# Patient Record
Sex: MALE | Race: WHITE | Employment: FULL TIME | URBAN - METROPOLITAN AREA
[De-identification: names, ages, dates, MRNs, and addresses within clinical notes are randomized per-mention and may not be internally consistent; named-entity substitution may affect disease eponyms.]

---

## 2017-03-26 RX ORDER — CANAGLIFLOZIN 300 MG/1
TABLET, FILM COATED ORAL
Qty: 30 TAB | Refills: 9 | Status: SHIPPED | OUTPATIENT
Start: 2017-03-26 | End: 2017-05-01 | Stop reason: SDUPTHER

## 2017-05-01 NOTE — TELEPHONE ENCOUNTER
Received faxed refill request for this medication from the pharmacy that is on file. Requesting a 90 day supply. amLODIPine-benazepril (LOTREL) 10-40 mg per capsule  Take 1 Cap by mouth daily. , Normal    canagliflozin (INVOKANA) 300 mg tablet  Normal, Disp-30 Tab, R-9    SITagliptin-metFORMIN (JANUMET XR) 100-1,000 mg TM24  Normal, Disp-30 Tab, R-5    pravastatin (PRAVACHOL) 40 mg tablet  Normal, Disp-90 Tab, R-2

## 2017-05-03 RX ORDER — AMLODIPINE BESYLATE AND BENAZEPRIL HYDROCHLORIDE 10; 40 MG/1; MG/1
1 CAPSULE ORAL DAILY
Qty: 30 CAP | Refills: 5 | Status: SHIPPED | OUTPATIENT
Start: 2017-05-03 | End: 2017-05-08 | Stop reason: SDUPTHER

## 2017-05-03 RX ORDER — PRAVASTATIN SODIUM 40 MG/1
TABLET ORAL
Qty: 30 TAB | Refills: 5 | Status: SHIPPED | OUTPATIENT
Start: 2017-05-03 | End: 2017-05-09 | Stop reason: SDUPTHER

## 2017-05-05 ENCOUNTER — OFFICE VISIT (OUTPATIENT)
Dept: FAMILY MEDICINE CLINIC | Age: 57
End: 2017-05-05

## 2017-05-05 VITALS
HEART RATE: 86 BPM | WEIGHT: 305 LBS | BODY MASS INDEX: 42.7 KG/M2 | SYSTOLIC BLOOD PRESSURE: 140 MMHG | TEMPERATURE: 98 F | RESPIRATION RATE: 14 BRPM | HEIGHT: 71 IN | OXYGEN SATURATION: 97 % | DIASTOLIC BLOOD PRESSURE: 94 MMHG

## 2017-05-05 DIAGNOSIS — E11.8 TYPE 2 DIABETES MELLITUS WITH COMPLICATION, WITHOUT LONG-TERM CURRENT USE OF INSULIN (HCC): Primary | ICD-10-CM

## 2017-05-05 DIAGNOSIS — I10 ESSENTIAL HYPERTENSION: ICD-10-CM

## 2017-05-05 DIAGNOSIS — F32.A DEPRESSION, UNSPECIFIED DEPRESSION TYPE: ICD-10-CM

## 2017-05-05 DIAGNOSIS — E78.00 HYPERCHOLESTEROLEMIA: ICD-10-CM

## 2017-05-05 DIAGNOSIS — G47.30 SLEEP APNEA, UNSPECIFIED TYPE: ICD-10-CM

## 2017-05-05 DIAGNOSIS — Z12.83 SKIN CANCER SCREENING: ICD-10-CM

## 2017-05-05 DIAGNOSIS — I87.2 CHRONIC VENOUS INSUFFICIENCY: ICD-10-CM

## 2017-05-05 DIAGNOSIS — E66.01 MORBID OBESITY, UNSPECIFIED OBESITY TYPE (HCC): Chronic | ICD-10-CM

## 2017-05-05 RX ORDER — LANCETS
EACH MISCELLANEOUS
Qty: 30 EACH | Refills: 11 | Status: SHIPPED | OUTPATIENT
Start: 2017-05-05

## 2017-05-05 RX ORDER — BLOOD-GLUCOSE METER
EACH MISCELLANEOUS
Qty: 1 EACH | Refills: 0 | Status: SHIPPED | OUTPATIENT
Start: 2017-05-05

## 2017-05-05 NOTE — MR AVS SNAPSHOT
Visit Information Date & Time Provider Department Dept. Phone Encounter #  
 5/5/2017  2:40 PM Bryce Ruiz, [address] [phone] [id number] Follow-up Instructions Return in about 3 months (around 8/5/2017) for yearly physical . Upcoming Health Maintenance Date Due Hepatitis C Screening 1960 FOOT EXAM Q1 1/21/1972 EYE EXAM RETINAL OR DILATED Q1 1/21/1972 DTaP/Tdap/Td series (1 - Tdap) 1/21/1983 FOBT Q 1 YEAR AGE 50-75 1/21/2012 HEMOGLOBIN A1C Q6M 6/14/2016 MICROALBUMIN Q1 12/14/2016 LIPID PANEL Q1 12/14/2016 INFLUENZA AGE 9 TO ADULT 8/1/2017 Allergies as of 5/5/2017  Review Complete On: 5/5/2017 By: Dolores Bolanos LPN No Known Allergies Current Immunizations  Reviewed on 2/27/2015 Name Date Pneumococcal Polysaccharide (PPSV-23) 10/30/2014 Not reviewed this visit You Were Diagnosed With   
  
 Codes Comments Type 2 diabetes mellitus with complication, without long-term current use of insulin (HCC)    -  Primary ICD-10-CM: E11.8 ICD-9-CM: 250.90 Essential hypertension     ICD-10-CM: I10 
ICD-9-CM: 401.9 Hypercholesterolemia     ICD-10-CM: E78.00 ICD-9-CM: 272.0 Chronic venous insufficiency     ICD-10-CM: I87.2 ICD-9-CM: 459.81 Morbid obesity, unspecified obesity type     ICD-10-CM: E66.01 
ICD-9-CM: 278.01 Sleep apnea, unspecified type     ICD-10-CM: G47.30 ICD-9-CM: 780.57 Depression, unspecified depression type     ICD-10-CM: F32.9 ICD-9-CM: 820 Skin cancer screening     ICD-10-CM: Z12.83 ICD-9-CM: V76.43 Vitals BP Pulse Temp Resp Height(growth percentile) Weight(growth percentile) (!) 140/94 86 98 °F (36.7 °C) (Oral) 14 5' 11\" (1.803 m) 305 lb (138.3 kg) SpO2 BMI Smoking Status 97% 42.54 kg/m2 Never Smoker Vitals History BMI and BSA Data Body Mass Index Body Surface Area  42.54 kg/m 2 2.63 m 2  
  
  
 Preferred Pharmacy Pharmacy Name Phone [facility] [address] , [address] [phone] Your Updated Medication List  
  
   
This list is accurate as of: 5/5/17  3:36 PM.  Always use your most recent med list. amLODIPine-benazepril 10-40 mg per capsule Commonly known as:  Felipe Noble Take 1 Cap by mouth daily. aspirin delayed-release 81 mg tablet Take  by mouth daily. Blood-Glucose Meter Misc Commonly known as:  ONETOUCH VERIO FLEX Daily testing for diabetes E11.9  
  
 canagliflozin 300 mg tablet Commonly known as:  Larinda Ovidio TAKE 1 TABLET BY MOUTH DAILY  WITH BREAKFAST  
  
 glucose blood VI test strips strip Commonly known as:  Mearl Raymon Daily bg testing for Dm e11.9  
  
 isosorbide mononitrate ER 30 mg tablet Commonly known as:  IMDUR Take 1 Tab by mouth daily. Lancets Misc Daily bg testing for Dm e11.9 LASIX 40 mg tablet Generic drug:  furosemide Take  by mouth daily. metoprolol tartrate 25 mg tablet Commonly known as:  LOPRESSOR Take  by mouth two (2) times a day. pantoprazole 40 mg tablet Commonly known as:  PROTONIX Take 40 mg by mouth daily. prasugrel 10 mg tablet Commonly known as:  EFFIENT Take  by mouth.  
  
 pravastatin 40 mg tablet Commonly known as:  PRAVACHOL  
TAKE 1 TABLET BY MOUTH NIGHTLY psyllium Powd Commonly known as:  METAMUCIL Take  by mouth. SITagliptin-metFORMIN 100-1,000 mg Tm24 Commonly known as:  JANUMET XR  
TAKE ONE TABLET BY MOUTH DAILY WITH DINNER Prescriptions Sent to Pharmacy Refills Blood-Glucose Meter (ONETOUCH VERIO FLEX) misc 0 Sig: Daily testing for diabetes E11.9  Class: Normal  
 Pharmacy: [facility] [address] , [address] Ph #: [phone]  
 glucose blood VI test strips (ONETOUCH VERIO) strip 11  
 Sig: Daily bg testing for Dm e11.9 Class: Normal  
 Pharmacy: [facility] [address], [address] [facility] Ph #: [phone] Lancets misc 11 Sig: Daily bg testing for Dm e11.9 Class: Normal  
 Pharmacy: [facility] [address], [address] [facility] Ph #: [phone] We Performed the Following HEMOGLOBIN A1C WITH EAG [71734 CPT(R)]  DIABETES EYE EXAM [HM6 Custom]  DIABETES FOOT EXAM [HM7 Custom] LIPID PANEL [61973 CPT(R)] METABOLIC PANEL, COMPREHENSIVE [66118 CPT(R)] MICROALBUMIN, UR, RAND W/ MICROALBUMIN/CREA RATIO I607585 CPT(R)] REFERRAL TO DERMATOLOGY [REF19 Custom] Comments:  
 Please evaluate patient for skin cancer eval.  
 REFERRAL TO OPHTHALMOLOGY [REF57 Custom] Comments:  
 Please evaluate patient for updated dm eye exam.  
 REFERRAL TO PODIATRY [REF90 Custom] Comments:  
 Please evaluate patient for diabetic foot exam.  
  
Follow-up Instructions Return in about 3 months (around 8/5/2017) for yearly physical . Referral Information Referral ID Referred By Referred To  
  
 [id number] Jw Mei Not Available Visits Status Start Date End Date 1 New Request 5/5/17 5/5/18 If your referral has a status of pending review or denied, additional information will be sent to support the outcome of this decision. Referral ID Referred By Referred To  
 [id number] Jw Mei Not Available Visits Status Start Date End Date 1 New Request 5/5/17 5/5/18 If your referral has a status of pending review or denied, additional information will be sent to support the outcome of this decision. Referral ID Referred By Referred To  
 [id number] Jw Mei Not Available Visits Status Start Date End Date 1 New Request 5/5/17 5/5/18  If your referral has a status of pending review or denied, additional information will be sent to support the outcome of this decision. Patient Instructions Fasting sugars should be less than 99 on average Sugars 2 hours after meals should be less than 180 on average Introducing [facility] SERVICES! Lisa Fitzpatrick introduces WriteLatex patient portal. Now you can access parts of your medical record, email your doctor's office, and request medication refills online. 1. In your internet browser, go to [email or web address] 2. Click on the First Time User? Click Here link in the Sign In box. You will see the New Member Sign Up page. 3. Enter your WriteLatex Access Code exactly as it appears below. You will not need to use this code after youve completed the sign-up process. If you do not sign up before the expiration date, you must request a new code. · WriteLatex Access Code: [email or web address] Expires: 8/3/2017  3:25 PM 
 
4. Enter the last four digits of your Social Security Number (xxxx) and Date of Birth (mm/dd/yyyy) as indicated and click Submit. You will be taken to the next sign-up page. 5. Create a WriteLatex ID. This will be your WriteLatex login ID and cannot be changed, so think of one that is secure and easy to remember. 6. Create a WriteLatex password. You can change your password at any time. 7. Enter your Password Reset Question and Answer. This can be used at a later time if you forget your password. 8. Enter your e-mail address. You will receive e-mail notification when new information is available in [address] 9. Click Sign Up. You can now view and download portions of your medical record. 10. Click the Download Summary menu link to download a portable copy of your medical information. If you have questions, please visit the Frequently Asked Questions section of the WriteLatex website. Remember, WriteLatex is NOT to be used for urgent needs. For medical emergencies, dial 911. Now available from your iPhone and Android! Please provide this summary of care documentation to your next provider. Your primary care clinician is listed as Matt Delgadillo. If you have any questions after today's visit, please call [phone].

## 2017-05-05 NOTE — PROGRESS NOTES
Pt presents to office today for a follow up on HTN, Diabetes and Cholesterol problem. Pt states that he had seen his Cardiologist Dr. Sarai Ramirez and Dr. Wilhemina Kanner within the last 2 months and have been cleared for 1 year. Chief Complaint   Patient presents with    Hypertension     Follow up.  Diabetes     Follow up.  Cholesterol Problem     Follow up. 1. Have you been to the ER, urgent care clinic since your last visit? Hospitalized since your last visit? No    2. Have you seen or consulted any other health care providers outside of the [address] [place] [address] since your last visit? Include any pap smears or colon screening.  No

## 2017-05-05 NOTE — PATIENT INSTRUCTIONS
Fasting sugars should be less than 99 on average  Sugars 2 hours after meals should be less than 180 on average

## 2017-05-05 NOTE — PROGRESS NOTES
HISTORY OF PRESENT ILLNESS  Meka Moreno is a 56 y.o. male. HPI  Chief Complaint   Patient presents with    Hypertension     Follow up.  Diabetes     Follow up.  Cholesterol Problem     Follow up. Meka Moreno is a 56 y.o. male with a PHMx of DM check up, pmh of cad, HTN, depression, sleep apnea and venous insuff    States he has not seen pcp since 2015, never connected with someone in [facility] and needs dm/routine fu. States he has connected to new cardio-, he had an ablation at [facility] since his last ov and now has a new cardio in [facility]. 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 Dr. Johan Egan at [facility] [address] heart cardiology phone is [phone]  . Dr Richard Carlos is electrophysiology person. States he has to do 90 day rx for [facility]. He has been off meds x 1 week and that is why bp is up. He knows his meds have now been shipped so should get them soon. Still on the effient. States he monitors swelling in his feet, generally controlled on the lasix. He does get muscle cramps  Not taking his potassium reguarly. States he has been feeling good. Needs to get back on his exercise kick. Tolerating excess well. Pt reports he does not check bg regularly. Needs meter. Has not yet seen eye or foot specialist     Has some stress with work. Denies feeling very depressed but does feel stressed sometimes after work. He would like to get back into exercise for this. Current Outpatient Prescriptions   Medication Sig Dispense Refill    psyllium (METAMUCIL) powd Take  by mouth.  Blood-Glucose Meter (ONETOUCH VERIO FLEX) [facility] Daily testing for diabetes E11.9 1 Each 0    glucose blood VI test strips (ONETOUCH VERIO) strip Daily bg testing for Dm e11.9 30 Strip 11    Lancets misc Daily bg testing for Dm e11.9 30 Each 11    pravastatin (PRAVACHOL) 40 mg tablet TAKE 1 TABLET BY MOUTH NIGHTLY 30 Tab 5    amLODIPine-benazepril (LOTREL) 10-40 mg per capsule Take 1 Cap by mouth daily. 30 Cap 5    canagliflozin (INVOKANA) 300 mg tablet TAKE 1 TABLET BY MOUTH DAILY  WITH BREAKFAST 30 Tab 5    SITagliptin-metFORMIN (JANUMET XR) 100-1,000 mg TM24 TAKE ONE TABLET BY MOUTH DAILY WITH DINNER 30 Tab 5    isosorbide mononitrate ER (IMDUR) 30 mg tablet Take 1 Tab by mouth daily. 30 Tab 5    aspirin delayed-release 81 mg tablet Take  by mouth daily.  furosemide (LASIX) 40 mg tablet Take  by mouth daily.  metoprolol (LOPRESSOR) 25 mg tablet Take  by mouth two (2) times a day.  prasugrel (EFFIENT) 10 mg tablet Take  by mouth.  pantoprazole (PROTONIX) 40 mg tablet Take 40 mg by mouth daily. No Known Allergies  Past Medical History:   Diagnosis Date    Arrhythmia     pvcs sp stent placement in 2014.  Depression     [facility]     Hypercholesterolemia     Hypertension     Morbid obesity ([facility]     Sleep apnea      Past Surgical History:   Procedure Laterality Date    CARDIAC SURG PROCEDURE UNLIST  7480,8332    stents, second placed 2/14, Ablation     HX APPENDECTOMY      HX COLONOSCOPY  2013,fall    to see in 2014      Family History   Problem Relation Age of Onset    Cancer Mother     Heart Disease Father     Diabetes Sister     Hypertension Sister      Social History   Substance Use Topics    Smoking status: Never Smoker    Smokeless tobacco: Current User     Types: Chew     Last attempt to quit: 1/1/2014      Comment: Chewing tobacco    Alcohol use 2.5 oz/week     5 Standard drinks or equivalent per week      Comment: Socially       Review of Systems   Constitutional: Negative. Negative for chills, diaphoresis, fever, malaise/fatigue and weight loss. HENT: Negative. Negative for congestion, ear discharge, ear pain, hearing loss, nosebleeds, sore throat and tinnitus. Eyes: Negative. Negative for blurred vision, photophobia, pain, discharge and redness. Respiratory: Negative.   Negative for cough, hemoptysis, sputum production, shortness of breath, wheezing and stridor. Cardiovascular: Negative. Negative for chest pain, palpitations, orthopnea, claudication, leg swelling and PND. Gastrointestinal: Negative. Negative for abdominal pain, diarrhea, heartburn, nausea and vomiting. Musculoskeletal: Negative. Skin: Negative. Neurological: Negative. Negative for weakness and headaches. Endo/Heme/Allergies: Negative. Psychiatric/Behavioral: Negative. All other systems reviewed and are negative. Physical Exam   Constitutional: He is oriented to person, place, and time. He appears well-developed and well-nourished. No distress. HENT:   Head: Normocephalic. Head is without raccoon's eyes. Right Ear: External ear normal.   Left Ear: External ear normal.   Nose: Nose normal. No mucosal edema, rhinorrhea or sinus tenderness. Right sinus exhibits no maxillary sinus tenderness and no frontal sinus tenderness. Left sinus exhibits no maxillary sinus tenderness and no frontal sinus tenderness. Mouth/Throat: Oropharynx is clear and moist. No oropharyngeal exudate. Eyes: Conjunctivae and EOM are normal. Pupils are equal, round, and reactive to light. Right eye exhibits no discharge. Left eye exhibits no discharge. Neck: Normal range of motion. Neck supple. Cardiovascular: Normal rate, regular rhythm, normal heart sounds and intact distal pulses. Pulmonary/Chest: Effort normal and breath sounds normal.   Lymphadenopathy:     He has no cervical adenopathy. Neurological: He is alert and oriented to person, place, and time. Skin: Skin is warm and dry.    Diabetic foot exam:     Left: Reflexes na     Vibratory sensation na    Proprioception normal   Sharp/dull discrimination normal    Filament test decreased to tip of middle toe, otherwise normal   Pulse DP: 2+ (normal)   Pulse PT: 2+ (normal)   Deformities: Yes - has callous   Right: Reflexes na   Vibratory sensation na   Proprioception normal   Sharp/dull discrimination diminished   Filament test reduced sensation with micro filament   Pulse DP: 2+ (normal)   Pulse PT: 2+ (normal)   Deformities: Yes - callous     Psychiatric: He has a normal mood and affect. His behavior is normal. Thought content normal.   Nursing note and vitals reviewed. PHQ over the last two weeks 5/5/2017   Little interest or pleasure in doing things Several days   Feeling down, depressed or hopeless Several days   Total Score PHQ 2 2       ASSESSMENT and PLAN    ICD-10-CM ICD-9-CM    1. Type 2 diabetes mellitus with complication, without long-term current use of insulin ([facility]) E11.8 250.90 MICROALBUMIN, UR, RAND W/ MICROALBUMIN/CREA RATIO      METABOLIC PANEL, COMPREHENSIVE      LIPID PANEL      HEMOGLOBIN A1C WITH EAG       DIABETES FOOT EXAM       DIABETES EYE EXAM      Blood-Glucose Meter (ONETOUCH VERIO FLEX) misc      glucose blood VI test strips (ONETOUCH VERIO) strip      Lancets [facility]      REFERRAL TO PODIATRY      REFERRAL TO OPHTHALMOLOGY   2. Essential hypertension I10 401.9    3. Hypercholesterolemia E78.00 272.0 LIPID PANEL   4. Chronic venous insufficiency I87.2 459.81    5. Morbid obesity, unspecified obesity type E66.01 278.01    6. Sleep apnea, unspecified type G47.30 780.57    7. Depression, unspecified depression type F32.9 311    8. Skin cancer screening Z12.83 V76.43 REFERRAL TO DERMATOLOGY     Jess Parada was seen today for hypertension, diabetes and cholesterol problem. Diagnoses and all orders for this visit:    Type 2 diabetes mellitus with complication, without long-term current use of insulin ([facility]  -   dwp he needs to fu every 3-6 months on his diabetes. Will update labs and adjust regimen as indicated as he has been out of care and out of meds. Restart meds per list and est him with at home bg testing.    MICROALBUMIN, UR, RAND W/ MICROALBUMIN/CREA RATIO  -     METABOLIC PANEL, COMPREHENSIVE  -     LIPID PANEL  -     HEMOGLOBIN A1C WITH EAG  -      DIABETES FOOT EXAM-chronic reduced sensation to right foot. Has chronic  Callous. Referred to podiatrist   -      DIABETES EYE EXAM  -     Blood-Glucose Meter (ONETOUCH VERIO FLEX) misc; Daily testing for diabetes E11.9  -     glucose blood VI test strips (ONETOUCH VERIO) strip; Daily bg testing for Dm e11.9  -     Lancets misc; Daily bg testing for Dm e11.9  -     REFERRAL TO PODIATRY  -     REFERRAL TO OPHTHALMOLOGY  Deviate plan per lab results   Patient Instructions   Fasting sugars should be less than 99 on average  Sugars 2 hours after meals should be less than 180 on average         Essential hypertension  bp elevated, restart medications and recheck in 3 months. Hypercholesterolemia  -     LIPID PANEL  Update labs. Chronic venous insufficiency    Morbid obesity, unspecified obesity type  Counseled with diet and exercise modifications   Sleep apnea, unspecified type  Stable   Depression, unspecified depression type  Will continue to monitor   Skin cancer screening  -   add  REFERRAL TO DERMATOLOGY      Over 50% of the 45 minutes face to face with Ca Jacobo consisted of counseling and/or discussing treatment plans in reference to his care plan as outlined above/reestablishing care for his chronic conditions.               Follow-up Disposition:  Return in about 3 months (around 8/5/2017) for yearly physical .

## 2017-05-06 LAB
ALBUMIN SERPL-MCNC: 4.6 G/DL (ref 3.5–5.5)
ALBUMIN/CREAT UR: 103.6 MG/G CREAT (ref 0–30)
ALBUMIN/GLOB SERPL: 2 {RATIO} (ref 1.2–2.2)
ALP SERPL-CCNC: 57 IU/L (ref 39–117)
ALT SERPL-CCNC: 36 IU/L (ref 0–44)
AST SERPL-CCNC: 21 IU/L (ref 0–40)
BILIRUB SERPL-MCNC: 0.4 MG/DL (ref 0–1.2)
BUN SERPL-MCNC: 10 MG/DL (ref 6–24)
BUN/CREAT SERPL: 11 (ref 9–20)
CALCIUM SERPL-MCNC: 9.6 MG/DL (ref 8.7–10.2)
CHLORIDE SERPL-SCNC: 99 MMOL/L (ref 96–106)
CHOLEST SERPL-MCNC: 206 MG/DL (ref 100–199)
CO2 SERPL-SCNC: 21 MMOL/L (ref 18–29)
CREAT SERPL-MCNC: 0.93 MG/DL (ref 0.76–1.27)
CREAT UR-MCNC: 69.4 MG/DL
EST. AVERAGE GLUCOSE BLD GHB EST-MCNC: 194 MG/DL
GLOBULIN SER CALC-MCNC: 2.3 G/DL (ref 1.5–4.5)
GLUCOSE SERPL-MCNC: 140 MG/DL (ref 65–99)
HBA1C MFR BLD: 8.4 % (ref 4.8–5.6)
HDLC SERPL-MCNC: 44 MG/DL
INTERPRETATION, 910389: NORMAL
LDLC SERPL CALC-MCNC: 127 MG/DL (ref 0–99)
MICROALBUMIN UR-MCNC: 71.9 UG/ML
POTASSIUM SERPL-SCNC: 4.2 MMOL/L (ref 3.5–5.2)
PROT SERPL-MCNC: 6.9 G/DL (ref 6–8.5)
SODIUM SERPL-SCNC: 139 MMOL/L (ref 134–144)
TRIGL SERPL-MCNC: 174 MG/DL (ref 0–149)
VLDLC SERPL CALC-MCNC: 35 MG/DL (ref 5–40)

## 2017-05-07 NOTE — PROGRESS NOTES
Notify pt his a1c is up to 8.4, there is protein in the urine indicating some damage from high blood sugars, LDL is above goal at 127, needs to be less than 100. He has been off his meds prior to visit, cont plan to restart everything and recheck labs in 3 months to hopefully see some improvements.

## 2017-05-09 NOTE — PROGRESS NOTES
Spoke with Pt on his labs results, but he had mentioned at his visit that he had off all his medications for about 1 week, prior to his appointment in the transition over to [address] from his local pharmacy. He reports that his medication came in the mail yesterday and have started back on them today, agrees to follow up in August 2017.

## 2017-05-10 RX ORDER — AMLODIPINE BESYLATE AND BENAZEPRIL HYDROCHLORIDE 10; 40 MG/1; MG/1
1 CAPSULE ORAL DAILY
Qty: 90 CAP | Refills: 1 | Status: SHIPPED | OUTPATIENT
Start: 2017-05-10

## 2017-05-10 RX ORDER — METOPROLOL TARTRATE 25 MG/1
25 TABLET, FILM COATED ORAL 2 TIMES DAILY
Qty: 180 TAB | Refills: 1 | Status: SHIPPED | OUTPATIENT
Start: 2017-05-10

## 2017-05-10 RX ORDER — PRAVASTATIN SODIUM 40 MG/1
TABLET ORAL
Qty: 90 TAB | Refills: 1 | Status: SHIPPED | OUTPATIENT
Start: 2017-05-10

## 2017-05-10 RX ORDER — ISOSORBIDE MONONITRATE 30 MG/1
30 TABLET, EXTENDED RELEASE ORAL DAILY
Qty: 90 TAB | Refills: 1 | Status: SHIPPED | OUTPATIENT
Start: 2017-05-10

## 2017-06-05 ENCOUNTER — TELEPHONE (OUTPATIENT)
Dept: FAMILY MEDICINE CLINIC | Age: 57
End: 2017-06-05

## 2017-06-05 NOTE — TELEPHONE ENCOUNTER
Patient is calling, patient states that he would like a call back from NP [place] or her nurses in regards to him having lower back pain, patient denied making an appointment, patient states that he would just like to speak with a nurse.      Best call back # for patient: [phone]

## 2017-06-05 NOTE — TELEPHONE ENCOUNTER
Writer returned call to patient to review below advisements per NP Hipolito Stallings, patient understood and agreed to understanding.

## 2017-06-05 NOTE — TELEPHONE ENCOUNTER
Writer returned call to patient, patient advised he is having severe lower and left side back pain making ambulating,sitting and activity uncomfortable. Patient is requesting recommendations for pain management and treatment as he was not able to work today due to the pain. Patient advises he lives 1 hour away and is requesting recommendations today.

## 2017-06-06 NOTE — LETTER
Aminata Price introduces [employer]omography patient portal. Now you can access parts of your medical record, email your doctor's office, and request medication refills online. 1. In your internet browser, go to [email or web address] 
2. Click on the First Time User? Click Here link in the Sign In box. You will see the New Member Sign Up page. 3. Enter your Lomography Access Code exactly as it appears below. You will not need to use this code after youve completed the sign-up process. If you do not sign up before the expiration date, you must request a new code. · Lomography Access Code:[email or web address] · Expires: 8/3/2017  3:25 PM 
 
4. Enter the last four digits of your Social Security Number (xxxx) and Date of Birth (mm/dd/yyyy) as indicated and click Submit. You will be taken to the next sign-up page. 5. Create a Lomography ID. This will be your Lomography login ID and cannot be changed, so think of one that is secure and easy to remember. 6. Create a Lomography password. You can change your password at any time. 7. Enter your Password Reset Question and Answer. This can be used at a later time if you forget your password. 8. Enter your e-mail address. You will receive e-mail notification when new information is available in [address] 9. Click Sign Up. You can now view and download portions of your medical record. 10. Click the Download Summary menu link to download a portable copy of your medical information. If you have questions, please visit the Frequently Asked Questions section of the Lomography website. Remember, Lomography is NOT to be used for urgent needs. For medical emergencies, dial 911. Now available from your iPhone and Android!

## 2017-06-06 NOTE — TELEPHONE ENCOUNTER
Writer attempted to return call to patient, left voicemail to call the office. Writer mailed copy of [employer] activation code to patient's home address on file. Message forwarded to provider for recommendations in regard to continued treatment of patient's pain.

## 2017-06-06 NOTE — TELEPHONE ENCOUNTER
Patient went to Urgent Care yesterday and they gave him muscle relaxer's but it hasn't helped much. He wants to know what would be his next step if this doesn't improve soon. He is not able to walk without assistance. I did advise that it takes a few days for the muscle relaxer's to work. Patient also wanted to let you know that his Rx for Junuvia 90 day supply has not been approved yet through [facility]. Can you assist?    He is also interested in [address] but needs assistance in setting this up.     Please call patient at [phone]

## 2017-06-07 NOTE — TELEPHONE ENCOUNTER
Pt made aware of message below, understanding voiced, he states he wants to change the quantity of his Janumet to 90 day supply because it is much cheaper for him. (med is attached to this message)     He also states back pain has worsened, he has follow up appt on 06/16/2017 to discuss next steps

## 2017-06-07 NOTE — TELEPHONE ENCOUNTER
Yes he is on janumet and there should still be 4 refills on his last rx. Please clarify with patient he should have refills at the pharmacy. Also as far as his back if not improving needs appt to eval next steps.  Thanks

## 2024-06-18 ENCOUNTER — HOSPITAL ENCOUNTER (INPATIENT)
Facility: HOSPITAL | Age: 64
LOS: 3 days | Discharge: HOME | End: 2024-06-21
Attending: EMERGENCY MEDICINE | Admitting: PSYCHIATRY & NEUROLOGY
Payer: MEDICARE

## 2024-06-18 DIAGNOSIS — F32.A DEPRESSION WITH SUICIDAL IDEATION: Primary | ICD-10-CM

## 2024-06-18 DIAGNOSIS — R45.851 DEPRESSION WITH SUICIDAL IDEATION: Primary | ICD-10-CM

## 2024-06-18 PROBLEM — F41.1 GAD (GENERALIZED ANXIETY DISORDER): Status: ACTIVE | Noted: 2024-06-18

## 2024-06-18 LAB
ALBUMIN SERPL-MCNC: 3.6 G/DL (ref 3.5–5)
ALBUMIN/GLOB SERPL: 1.2 (ref 1.1–2.2)
ALP SERPL-CCNC: 81 U/L (ref 45–117)
ALT SERPL-CCNC: 33 U/L (ref 12–78)
AMPHET UR QL SCN: NEGATIVE
ANION GAP SERPL CALC-SCNC: 7 MMOL/L (ref 5–15)
APAP SERPL-MCNC: <2 UG/ML (ref 10–30)
AST SERPL-CCNC: 18 U/L (ref 15–37)
BARBITURATES UR QL SCN: NEGATIVE
BASOPHILS # BLD: 0.1 K/UL (ref 0–0.1)
BASOPHILS NFR BLD: 1 % (ref 0–1)
BENZODIAZ UR QL: NEGATIVE
BILIRUB SERPL-MCNC: 1 MG/DL (ref 0.2–1)
BUN SERPL-MCNC: 16 MG/DL (ref 6–20)
BUN/CREAT SERPL: 17 (ref 12–20)
CALCIUM SERPL-MCNC: 9.1 MG/DL (ref 8.5–10.1)
CANNABINOIDS UR QL SCN: NEGATIVE
CHLORIDE SERPL-SCNC: 112 MMOL/L (ref 97–108)
CO2 SERPL-SCNC: 20 MMOL/L (ref 21–32)
COCAINE UR QL SCN: NEGATIVE
CREAT SERPL-MCNC: 0.92 MG/DL (ref 0.7–1.3)
DIFFERENTIAL METHOD BLD: NORMAL
EOSINOPHIL # BLD: 0.1 K/UL (ref 0–0.4)
EOSINOPHIL NFR BLD: 1 % (ref 0–7)
ERYTHROCYTE [DISTWIDTH] IN BLOOD BY AUTOMATED COUNT: 13.7 % (ref 11.5–14.5)
ETHANOL SERPL-MCNC: <10 MG/DL (ref 0–0.08)
GLOBULIN SER CALC-MCNC: 2.9 G/DL (ref 2–4)
GLUCOSE BLD STRIP.AUTO-MCNC: 153 MG/DL (ref 65–117)
GLUCOSE SERPL-MCNC: 186 MG/DL (ref 65–100)
HCT VFR BLD AUTO: 45.8 % (ref 36.6–50.3)
HGB BLD-MCNC: 15.8 G/DL (ref 12.1–17)
IMM GRANULOCYTES # BLD AUTO: 0 K/UL (ref 0–0.04)
IMM GRANULOCYTES NFR BLD AUTO: 0 % (ref 0–0.5)
LYMPHOCYTES # BLD: 1.4 K/UL (ref 0.8–3.5)
LYMPHOCYTES NFR BLD: 18 % (ref 12–49)
Lab: NORMAL
MCH RBC QN AUTO: 30.5 PG (ref 26–34)
MCHC RBC AUTO-ENTMCNC: 34.5 G/DL (ref 30–36.5)
MCV RBC AUTO: 88.4 FL (ref 80–99)
METHADONE UR QL: NEGATIVE
MONOCYTES # BLD: 0.7 K/UL (ref 0–1)
MONOCYTES NFR BLD: 10 % (ref 5–13)
NEUTS SEG # BLD: 5.4 K/UL (ref 1.8–8)
NEUTS SEG NFR BLD: 70 % (ref 32–75)
NRBC # BLD: 0 K/UL (ref 0–0.01)
NRBC BLD-RTO: 0 PER 100 WBC
OPIATES UR QL: NEGATIVE
PCP UR QL: NEGATIVE
PLATELET # BLD AUTO: 195 K/UL (ref 150–400)
PMV BLD AUTO: 10.3 FL (ref 8.9–12.9)
POTASSIUM SERPL-SCNC: 3.3 MMOL/L (ref 3.5–5.1)
PROT SERPL-MCNC: 6.5 G/DL (ref 6.4–8.2)
RBC # BLD AUTO: 5.18 M/UL (ref 4.1–5.7)
SALICYLATES SERPL-MCNC: <1.7 MG/DL (ref 2.8–20)
SERVICE CMNT-IMP: ABNORMAL
SODIUM SERPL-SCNC: 139 MMOL/L (ref 136–145)
SPECIMEN HOLD: NORMAL
WBC # BLD AUTO: 7.7 K/UL (ref 4.1–11.1)

## 2024-06-18 PROCEDURE — 82962 GLUCOSE BLOOD TEST: CPT

## 2024-06-18 PROCEDURE — 85025 COMPLETE CBC W/AUTO DIFF WBC: CPT

## 2024-06-18 PROCEDURE — 99285 EMERGENCY DEPT VISIT HI MDM: CPT

## 2024-06-18 PROCEDURE — 82077 ASSAY SPEC XCP UR&BREATH IA: CPT

## 2024-06-18 PROCEDURE — 80053 COMPREHEN METABOLIC PANEL: CPT

## 2024-06-18 PROCEDURE — 80307 DRUG TEST PRSMV CHEM ANLYZR: CPT

## 2024-06-18 PROCEDURE — 80179 DRUG ASSAY SALICYLATE: CPT

## 2024-06-18 PROCEDURE — 80143 DRUG ASSAY ACETAMINOPHEN: CPT

## 2024-06-18 PROCEDURE — 1240000000 HC EMOTIONAL WELLNESS R&B

## 2024-06-18 PROCEDURE — 6370000000 HC RX 637 (ALT 250 FOR IP)

## 2024-06-18 PROCEDURE — 83036 HEMOGLOBIN GLYCOSYLATED A1C: CPT

## 2024-06-18 PROCEDURE — 36415 COLL VENOUS BLD VENIPUNCTURE: CPT

## 2024-06-18 PROCEDURE — 90791 PSYCH DIAGNOSTIC EVALUATION: CPT

## 2024-06-18 PROCEDURE — 6370000000 HC RX 637 (ALT 250 FOR IP): Performed by: EMERGENCY MEDICINE

## 2024-06-18 RX ORDER — INSULIN DEGLUDEC 200 U/ML
30 INJECTION, SOLUTION SUBCUTANEOUS ONCE
Status: ON HOLD | COMMUNITY
Start: 2024-05-06 | End: 2024-06-21 | Stop reason: HOSPADM

## 2024-06-18 RX ORDER — TRAZODONE HYDROCHLORIDE 50 MG/1
50 TABLET ORAL NIGHTLY PRN
Status: DISCONTINUED | OUTPATIENT
Start: 2024-06-18 | End: 2024-06-21 | Stop reason: HOSPADM

## 2024-06-18 RX ORDER — DIPHENHYDRAMINE HYDROCHLORIDE 50 MG/ML
50 INJECTION INTRAMUSCULAR; INTRAVENOUS EVERY 4 HOURS PRN
Status: DISCONTINUED | OUTPATIENT
Start: 2024-06-18 | End: 2024-06-21 | Stop reason: HOSPADM

## 2024-06-18 RX ORDER — MAGNESIUM HYDROXIDE/ALUMINUM HYDROXICE/SIMETHICONE 120; 1200; 1200 MG/30ML; MG/30ML; MG/30ML
30 SUSPENSION ORAL EVERY 6 HOURS PRN
Status: DISCONTINUED | OUTPATIENT
Start: 2024-06-18 | End: 2024-06-21 | Stop reason: HOSPADM

## 2024-06-18 RX ORDER — ALPRAZOLAM 0.5 MG/1
0.5 TABLET ORAL
Status: COMPLETED | OUTPATIENT
Start: 2024-06-18 | End: 2024-06-18

## 2024-06-18 RX ORDER — HALOPERIDOL 5 MG/1
5 TABLET ORAL EVERY 4 HOURS PRN
Status: DISCONTINUED | OUTPATIENT
Start: 2024-06-18 | End: 2024-06-21 | Stop reason: HOSPADM

## 2024-06-18 RX ORDER — INSULIN LISPRO 100 [IU]/ML
0-4 INJECTION, SOLUTION INTRAVENOUS; SUBCUTANEOUS NIGHTLY
Status: DISCONTINUED | OUTPATIENT
Start: 2024-06-18 | End: 2024-06-21 | Stop reason: HOSPADM

## 2024-06-18 RX ORDER — INSULIN LISPRO 100 [IU]/ML
0-8 INJECTION, SOLUTION INTRAVENOUS; SUBCUTANEOUS
Status: DISCONTINUED | OUTPATIENT
Start: 2024-06-19 | End: 2024-06-21 | Stop reason: HOSPADM

## 2024-06-18 RX ORDER — HYDROXYZINE 50 MG/1
50 TABLET, FILM COATED ORAL 3 TIMES DAILY PRN
Status: DISCONTINUED | OUTPATIENT
Start: 2024-06-18 | End: 2024-06-21 | Stop reason: HOSPADM

## 2024-06-18 RX ORDER — HALOPERIDOL 5 MG/ML
5 INJECTION INTRAMUSCULAR EVERY 4 HOURS PRN
Status: DISCONTINUED | OUTPATIENT
Start: 2024-06-18 | End: 2024-06-21 | Stop reason: HOSPADM

## 2024-06-18 RX ORDER — SENNOSIDES A AND B 8.6 MG/1
1 TABLET, FILM COATED ORAL DAILY PRN
Status: DISCONTINUED | OUTPATIENT
Start: 2024-06-18 | End: 2024-06-21 | Stop reason: HOSPADM

## 2024-06-18 RX ORDER — LOPERAMIDE HYDROCHLORIDE 2 MG/1
2 CAPSULE ORAL 4 TIMES DAILY PRN
COMMUNITY
Start: 2024-06-06

## 2024-06-18 RX ORDER — METOPROLOL SUCCINATE 100 MG/1
100 TABLET, EXTENDED RELEASE ORAL DAILY
COMMUNITY
Start: 2024-04-24

## 2024-06-18 RX ORDER — POLYETHYLENE GLYCOL 3350 17 G/17G
17 POWDER, FOR SOLUTION ORAL DAILY PRN
Status: DISCONTINUED | OUTPATIENT
Start: 2024-06-18 | End: 2024-06-21 | Stop reason: HOSPADM

## 2024-06-18 RX ORDER — BENZOCAINE/MENTHOL 6 MG-10 MG
LOZENGE MUCOUS MEMBRANE EVERY 6 HOURS PRN
Status: DISCONTINUED | OUTPATIENT
Start: 2024-06-18 | End: 2024-06-21 | Stop reason: HOSPADM

## 2024-06-18 RX ORDER — ACETAMINOPHEN 325 MG/1
650 TABLET ORAL EVERY 4 HOURS PRN
Status: DISCONTINUED | OUTPATIENT
Start: 2024-06-18 | End: 2024-06-21 | Stop reason: HOSPADM

## 2024-06-18 RX ORDER — DIGOXIN 125 MCG
125 TABLET ORAL DAILY
COMMUNITY
Start: 2024-03-27

## 2024-06-18 RX ORDER — ATORVASTATIN CALCIUM 40 MG/1
40 TABLET, FILM COATED ORAL DAILY
COMMUNITY
Start: 2024-04-24

## 2024-06-18 RX ORDER — EMPAGLIFLOZIN 25 MG/1
25 TABLET, FILM COATED ORAL DAILY
COMMUNITY
Start: 2024-06-05

## 2024-06-18 RX ORDER — LOPERAMIDE HYDROCHLORIDE 2 MG/1
2 CAPSULE ORAL 4 TIMES DAILY PRN
Status: DISCONTINUED | OUTPATIENT
Start: 2024-06-18 | End: 2024-06-21 | Stop reason: HOSPADM

## 2024-06-18 RX ORDER — SPIRONOLACTONE 25 MG/1
25 TABLET ORAL DAILY
COMMUNITY
Start: 2024-04-25

## 2024-06-18 RX ADMIN — LOPERAMIDE HYDROCHLORIDE 2 MG: 2 CAPSULE ORAL at 23:07

## 2024-06-18 RX ADMIN — TRAZODONE HYDROCHLORIDE 50 MG: 50 TABLET ORAL at 22:53

## 2024-06-18 RX ADMIN — HYDROXYZINE HYDROCHLORIDE 50 MG: 50 TABLET, FILM COATED ORAL at 23:33

## 2024-06-18 RX ADMIN — ALPRAZOLAM 0.5 MG: 0.5 TABLET ORAL at 17:37

## 2024-06-18 ASSESSMENT — ENCOUNTER SYMPTOMS
BACK PAIN: 0
SORE THROAT: 0
SHORTNESS OF BREATH: 0
COUGH: 0
ABDOMINAL PAIN: 0
TROUBLE SWALLOWING: 0
NAUSEA: 0
VOMITING: 0
DIARRHEA: 0

## 2024-06-18 ASSESSMENT — LIFESTYLE VARIABLES
HOW OFTEN DO YOU HAVE A DRINK CONTAINING ALCOHOL: 2-4 TIMES A MONTH
HOW MANY STANDARD DRINKS CONTAINING ALCOHOL DO YOU HAVE ON A TYPICAL DAY: 1 OR 2

## 2024-06-18 ASSESSMENT — SLEEP AND FATIGUE QUESTIONNAIRES
AVERAGE NUMBER OF SLEEP HOURS: 6
DO YOU HAVE DIFFICULTY SLEEPING: YES
SLEEP PATTERN: RESTLESSNESS
DO YOU USE A SLEEP AID: YES

## 2024-06-18 ASSESSMENT — PAIN - FUNCTIONAL ASSESSMENT: PAIN_FUNCTIONAL_ASSESSMENT: NONE - DENIES PAIN

## 2024-06-18 NOTE — ED PROVIDER NOTES
[facility] EMERGENCY DEP  EMERGENCY DEPARTMENT ENCOUNTER      Pt Name: Chaz Jackson  MRN: [id number]  Birthdate 1960  Date of evaluation: 6/18/2024  Provider: GOLDIE Arroyo NP    CHIEF COMPLAINT       Chief Complaint   Patient presents with    Suicidal         HISTORY OF PRESENT ILLNESS   (Location/Symptom, Timing/Onset, Context/Setting, Quality, Duration, Modifying Factors, Severity)  Note limiting factors.   HPI  Patient is a 64-year-old male with past medical history significant for coronary artery disease, sleep apnea, diabetes, depression, hypertension, hypercholesterolemia, atrial fibrillation on Plavix and chronic venous insufficiency who presents to the ED reporting suicidal thoughts and insomnia.Denies fever, headache, neck pain, visual changes, focal weakness or rash. Denies any difficulty breathing, difficulty swallowing, SOB or chest pain. Denies any nausea, vomiting or diarrhea. He asked his significant other to bring him to the ED.        Review of External Medical Records:     Nursing Notes were reviewed.    REVIEW OF SYSTEMS    (2-9 systems for level 4, 10 or more for level 5)     Review of Systems   Constitutional:  Negative for activity change, appetite change, fever and unexpected weight change.   HENT:  Negative for congestion, sore throat and trouble swallowing.    Eyes:  Negative for visual disturbance.   Respiratory:  Negative for cough and shortness of breath.    Cardiovascular:  Negative for chest pain, palpitations and leg swelling.   Gastrointestinal:  Negative for abdominal pain, diarrhea, nausea and vomiting.   Genitourinary:  Negative for dysuria.   Musculoskeletal:  Negative for back pain and neck pain.   Neurological:  Negative for dizziness, light-headedness and headaches.   Psychiatric/Behavioral:  Positive for sleep disturbance and suicidal ideas.    All other systems reviewed and are negative.      Except as noted above the remainder of the review of systems was reviewed and

## 2024-06-18 NOTE — BSMART NOTE
Comprehensive Assessment Form Part 1      Section I - Disposition    Generalized Anxiety Disorder  Unspecified mood disorder    No past medical history on file.    The Medical Doctor to Psychiatrist conference was not completed.  The Medical Doctor is in agreement with Psychiatrist disposition because patient is agreeable to inpatient BHU admission.  The plan is to admit to the BHU.  The on-call Psychiatrist consulted was N/A.  The admitting Psychiatrist will be SPENCER.  The admitting Diagnosis is JOSHUA, unspecified mood disorder.  The Payor source is not on file.     This writer reviewed the Aiken Suicide Severity Rating Scale in nursing flowsheet and the risk level assigned is high risk.  Based on this assessment, the risk of suicide is moderate risk and the plan is to admit to the BHU.    Section II - Integrated Summary  Summary:  Patient arrived to the ED accompanied by his girlfriend, Vita, with complaints of SI and increased anxiety. This clinician met with Cahz face to face in the ED. He was oriented x4 and was calm, cooperative, and pleasant during the assessment. He presented with an anxious mood and became easily worried about things being discussed. He endorses SI with no specific plan, however, states he has been thinking about multiple ways he could attempt. He denies any previous suicide attempts or inpatient BHU admissions. No HI reported.    Chaz has no formal mental health history and is not enrolled in outpatient services. He is not prescribed any medications. He reports having increased anxiety and depression due to life stressors. These stressors include limited finances, trying to move to the [facility] to be closer to his girlfriend and family, and health issues. Chaz reports having some GI issues that he is working to get diagnosed and is scheduled for a colonoscopy in August. He believes it may be Crohn's disease or IBS. He states he has called his insurance to get a list of providers in

## 2024-06-18 NOTE — ED NOTES
Pt in green gown. Clothes and tennis shoes in belongings bag placed in  locker on shelf 5. Pt has been wanded by security.

## 2024-06-18 NOTE — ED TRIAGE NOTES
Pt c/o SI with lots of different plans , denies HI, denies having any alcohol, drugs or weapons on him, denies auditory or visual hallucinations

## 2024-06-18 NOTE — BSMART NOTE
Patient's girlfriend, Vita [phone], will be getting his CPAP from home and returning to [facility] with it for U admission.

## 2024-06-18 NOTE — BSMART NOTE
BSMART assessment completed, and suicide risk level noted to be moderate risk. Charge Nurse Kelly and NP Hannah Yen notified. Concerns not observed. Patient requires a 1:1 constant observer sitter in the ED as he is moderate risk for suicide.

## 2024-06-19 PROBLEM — R45.851 DEPRESSION WITH SUICIDAL IDEATION: Status: ACTIVE | Noted: 2024-06-19

## 2024-06-19 PROBLEM — F32.A DEPRESSION WITH SUICIDAL IDEATION: Status: ACTIVE | Noted: 2024-06-19

## 2024-06-19 LAB
EKG ATRIAL RATE: 81 BPM
EKG DIAGNOSIS: NORMAL
EKG Q-T INTERVAL: 430 MS
EKG QRS DURATION: 166 MS
EKG QTC CALCULATION (BAZETT): 502 MS
EKG R AXIS: 262 DEGREES
EKG T AXIS: 88 DEGREES
EKG VENTRICULAR RATE: 82 BPM
GLUCOSE BLD STRIP.AUTO-MCNC: 114 MG/DL (ref 65–117)
GLUCOSE BLD STRIP.AUTO-MCNC: 119 MG/DL (ref 65–117)
GLUCOSE BLD STRIP.AUTO-MCNC: 119 MG/DL (ref 65–117)
GLUCOSE BLD STRIP.AUTO-MCNC: 87 MG/DL (ref 65–117)
SERVICE CMNT-IMP: ABNORMAL
SERVICE CMNT-IMP: ABNORMAL
SERVICE CMNT-IMP: NORMAL
SERVICE CMNT-IMP: NORMAL

## 2024-06-19 PROCEDURE — 1240000000 HC EMOTIONAL WELLNESS R&B

## 2024-06-19 PROCEDURE — 99221 1ST HOSP IP/OBS SF/LOW 40: CPT | Performed by: CLINICAL NURSE SPECIALIST

## 2024-06-19 PROCEDURE — 82962 GLUCOSE BLOOD TEST: CPT

## 2024-06-19 PROCEDURE — 6370000000 HC RX 637 (ALT 250 FOR IP)

## 2024-06-19 PROCEDURE — 6370000000 HC RX 637 (ALT 250 FOR IP): Performed by: PSYCHIATRY & NEUROLOGY

## 2024-06-19 PROCEDURE — 93005 ELECTROCARDIOGRAM TRACING: CPT

## 2024-06-19 RX ORDER — INSULIN GLARGINE 100 [IU]/ML
15 INJECTION, SOLUTION SUBCUTANEOUS NIGHTLY
Status: DISCONTINUED | OUTPATIENT
Start: 2024-06-19 | End: 2024-06-21 | Stop reason: HOSPADM

## 2024-06-19 RX ORDER — DIGOXIN 125 MCG
125 TABLET ORAL DAILY
Status: DISCONTINUED | OUTPATIENT
Start: 2024-06-19 | End: 2024-06-21 | Stop reason: HOSPADM

## 2024-06-19 RX ORDER — BUMETANIDE 1 MG/1
1 TABLET ORAL DAILY PRN
Status: ON HOLD | COMMUNITY
End: 2024-06-21 | Stop reason: HOSPADM

## 2024-06-19 RX ORDER — DEXTROSE MONOHYDRATE 100 MG/ML
INJECTION, SOLUTION INTRAVENOUS CONTINUOUS PRN
Status: DISCONTINUED | OUTPATIENT
Start: 2024-06-19 | End: 2024-06-21 | Stop reason: HOSPADM

## 2024-06-19 RX ORDER — ASPIRIN 81 MG/1
81 TABLET ORAL DAILY
Status: DISCONTINUED | OUTPATIENT
Start: 2024-06-20 | End: 2024-06-21 | Stop reason: HOSPADM

## 2024-06-19 RX ORDER — INSULIN GLARGINE 100 [IU]/ML
15 INJECTION, SOLUTION SUBCUTANEOUS DAILY
Status: DISCONTINUED | OUTPATIENT
Start: 2024-06-19 | End: 2024-06-19

## 2024-06-19 RX ORDER — ASPIRIN 81 MG/1
81 TABLET ORAL DAILY
COMMUNITY

## 2024-06-19 RX ORDER — ATORVASTATIN CALCIUM 20 MG/1
40 TABLET, FILM COATED ORAL NIGHTLY
Status: DISCONTINUED | OUTPATIENT
Start: 2024-06-19 | End: 2024-06-21 | Stop reason: HOSPADM

## 2024-06-19 RX ORDER — INSULIN GLARGINE 100 [IU]/ML
15 INJECTION, SOLUTION SUBCUTANEOUS NIGHTLY
Status: DISCONTINUED | OUTPATIENT
Start: 2024-06-20 | End: 2024-06-19

## 2024-06-19 RX ORDER — METOPROLOL SUCCINATE 25 MG/1
100 TABLET, EXTENDED RELEASE ORAL DAILY
Status: DISCONTINUED | OUTPATIENT
Start: 2024-06-19 | End: 2024-06-21 | Stop reason: HOSPADM

## 2024-06-19 RX ORDER — SPIRONOLACTONE 25 MG/1
25 TABLET ORAL DAILY
Status: DISCONTINUED | OUTPATIENT
Start: 2024-06-19 | End: 2024-06-21 | Stop reason: HOSPADM

## 2024-06-19 RX ADMIN — TRAZODONE HYDROCHLORIDE 50 MG: 50 TABLET ORAL at 23:44

## 2024-06-19 RX ADMIN — SERTRALINE HYDROCHLORIDE 25 MG: 50 TABLET ORAL at 11:23

## 2024-06-19 RX ADMIN — DIGOXIN 125 MCG: 125 TABLET ORAL at 11:35

## 2024-06-19 RX ADMIN — APIXABAN 5 MG: 5 TABLET, FILM COATED ORAL at 21:04

## 2024-06-19 RX ADMIN — EMPAGLIFLOZIN 25 MG: 10 TABLET, FILM COATED ORAL at 11:23

## 2024-06-19 RX ADMIN — SPIRONOLACTONE 25 MG: 25 TABLET ORAL at 11:23

## 2024-06-19 RX ADMIN — ATORVASTATIN CALCIUM 40 MG: 20 TABLET, FILM COATED ORAL at 21:04

## 2024-06-19 RX ADMIN — HYDROXYZINE HYDROCHLORIDE 50 MG: 50 TABLET, FILM COATED ORAL at 23:44

## 2024-06-19 RX ADMIN — SACUBITRIL AND VALSARTAN 1 TABLET: 24; 26 TABLET, FILM COATED ORAL at 21:03

## 2024-06-19 RX ADMIN — METOPROLOL SUCCINATE 100 MG: 25 TABLET, EXTENDED RELEASE ORAL at 11:23

## 2024-06-19 RX ADMIN — LOPERAMIDE HYDROCHLORIDE 2 MG: 2 CAPSULE ORAL at 17:10

## 2024-06-19 NOTE — BH NOTE
Behavioral Health Interdisciplinary Rounds     Patient Name: Chaz Jackson Age: 64 y.o. Room/Bed:  727/  Primary Diagnosis: JOSHUA (generalized anxiety disorder)  Admission Status: Voluntary     Readmission within 30 days: No  Power of  in place: No  Patient requires a blocked bed: No          Reason for blocked bed: n/a    Sleep hours: new admission       Participation in Care/Groups:  new admission  Medication Compliant?: new admission   PRNS (last 24 hours): yes - sleep     Restraints (last 24 hours):  no  Substance Abuse:  No    24 hour chart check complete: new admission     Patient goal(s) for today: meet with treatment team   Treatment team focus/goals: Plan to assess for medications and discharge needs     Progress note: He is willing to start Zoloft .  He denies SI today .  He signed a KAYKAY for his girlfriend.  He will need follow up.   Pleasant and compliant with treatment team     Patient presents ao x 4 He  appears his  stated age. The patient appearance shows no evidence of impairment. The patient presents Cooperative. His speech shows no evidence of impairment. The patient's affect presents appoiatate . Their affect does appear congruent with their content of speech. The patient does not appear to be responding to internal stimuli. The patient participated fully with treatment and discharge planning discussion.     LOS:  1  Expected LOS: 2 days    Insurance coverage: [facility] Medicare   Family contact: He signed a KAYKAY for  Vita Galvan  Domestic Partner  [phone]       Family requesting physician contact today:  No  Discharge plan: Home/Self Care.   Access to weapons: Yes       Outpatient provider(s): TBD / may be his PCP   Patient's preferred phone number for follow up call: [phone]    Participating treatment team members: Dr. Ruben Caal,RN - Maru Arredondo,EMILY - Paris Guidry, DuglasD.

## 2024-06-19 NOTE — PLAN OF CARE
Problem: Depression  Goal: Will be euthymic at discharge  Description: INTERVENTIONS:  1. Administer medication as ordered  2. Provide emotional support via 1:1 interaction with staff  3. Encourage involvement in milieu/groups/activities  4. Monitor for social isolation  Outcome: Progressing  Note: Out on unit social and engaged. Participating in groups and activities. Discussed diabetes medication adjustments with diabetes education team. Pt verbalized understanding. Describes increase anxiety related to social stressors such as selling home and chronic medical status such as his cardiac rhythm. States he was under the impression coming to ED he would have talked with a therapist and start a medication not being admitted. Describes for past 3 weeks he has started to find psych provider as outpatient. Describes many barriers with not finding an accepting provider. States he primary admission goal was to establish psych provider and a medication. States he feels safe on unit and at his home with his support system. Describes a change of perspective on his stressors once he was admitted to unit and engaging with his peers on unit. Denies SI, no plan, no intent and no self harming behaviors. Staff focus is on offering support and reassurance

## 2024-06-19 NOTE — BH NOTE
TRANSFER - IN REPORT:    Verbal report received from Bismark RN on Chaz Jackson  being received from [facility] ED for routine progression of patient care      Report consisted of patient's Situation, Background, Assessment and   Recommendations(SBAR).     Information from the following report(s) ED Encounter Summary, MAR, and Recent Results was reviewed with the receiving nurse.    Opportunity for questions and clarification was provided.      Assessment completed upon patient's arrival to unit and care assumed.

## 2024-06-19 NOTE — H&P
PSYCHIATRY EVALUATION NOTE    CHIEF COMPLAINT: \"I was freaking out.\"      HISTORY OF PRESENTING COMPLAINT:  Chaz Jackson is a 64 y.o. White (non-) male who is currently admitted to the general side of 7th floor behavioral health Unit at [facility].     Patient says that he was quite overwhelmed for the past few months as he's been trying to figure out how to sell his house in [place] [place] [facility] and move back to [place] where his GF lives, and most of his supports. He's been trying to get in with mental health now for a few weeks but the wait has been long. He reports that his sleep has been terrible and wasn't able to fall or stay asleep. He feels quite anxious through the day. He starts thinking about being dead, which he describes being easier than getting to a happy spot. However, he reports that he wouldn't do it.    Denies experiencing AVH.    Pt reports that he possesses some hunting rifles at home and that he would be willing to let them go before his return.    PAST PSYCHIATRIC HISTORY   No past inpatient psychiatric admissions  No suicide attempts    SUBSTANCE USE HISTORY:  Tobacco: chewing.  Cannabis: occasional   Alcohol: stopped drinking  IVD: none  No Cocaine/Heroin/amphetamines/LSD/PCP/Ketamine      Allergies: No Known Allergies    PAST MEDICAL HISTORY:    Please see H&P for details.     History reviewed. No pertinent past medical history.  Prior to Admission medications    Medication Sig Start Date End Date Taking? Authorizing Provider   spironolactone (ALDACTONE) 25 MG tablet Take 1 tablet by mouth daily 4/25/24  Yes Provider, MD Iman   rivaroxaban (XARELTO) 20 MG TABS tablet Take 1 tablet by mouth 2 times daily (with meals) 6/28/19  Yes Provider, Historical, MD   digoxin (LANOXIN) 125 MCG tablet Take 1 tablet by mouth daily 3/27/24  Yes Provider, Historical, MD   metoprolol succinate (TOPROL XL) 100 MG extended release tablet Take 1 tablet by mouth daily 4/24/24  Yes Provider,  MD Iman   loperamide (IMODIUM) 2 MG capsule Take 1 capsule by mouth 4 times daily as needed for Diarrhea 6/6/24  Yes Provider, MD Iman   TRESIBA FLEXTOUCH 200 UNIT/ML SOPN Inject 30 Units into the skin once Patient takes 30-40 units a day based on BG once in the am 5/6/24  Yes Provider, MD Iman   JARDIANCE 25 MG tablet Take 1 tablet by mouth daily 6/5/24  Yes Provider, MD Iman   atorvastatin (LIPITOR) 40 MG tablet Take 1 tablet by mouth daily 4/24/24  Yes Provider, Iman, MD     [unfilled]  Lab Results   Component Value Date    WBC 7.7 06/18/2024    HGB 15.8 06/18/2024    HCT 45.8 06/18/2024    MCV 88.4 06/18/2024     06/18/2024     Lab Results   Component Value Date     06/18/2024    K 3.3 (L) 06/18/2024     (H) 06/18/2024    CO2 20 (L) 06/18/2024    BUN 16 06/18/2024    CREATININE 0.92 06/18/2024    GLUCOSE 186 (H) 06/18/2024    CALCIUM 9.1 06/18/2024    BILITOT 1.0 06/18/2024    ALKPHOS 81 06/18/2024    AST 18 06/18/2024    ALT 33 06/18/2024    LABGLOM >90 06/18/2024    GLOB 2.9 06/18/2024     FAMILY HISTORY:  No past suicide attempts/completions  No Bipolar disorder or Schizophrenia diagnoses in the family  B, S: ETOH.    PSYCHOSOCIAL HISTORY:  Lives in [place] [facility], was visiting his GF in [place]. She retired, but moved away from [facility].  4 siblings.  Not working.  Has home in [place].  On social security disability.  Daughter in [place], son in [place].    MENTAL STATUS EXAM:  63yo WM has gray hair and unshaven beard.  General appearance:    groomed, psychomotor activity is   Eye contact: Avoids eye contact  Speech: Spontaneous, has NL rate, volume and prosody.  Thought Process is Logical, linear, and goal directed.  Mood is reported as \" \"  Affect is congruent and dysthymic  Passive death wish but no Suicidal intents or plans.   No Homicidal thoughts, intents or plans. Does have access to fire-arms  Denies experiencing hallucinations of any

## 2024-06-19 NOTE — BH NOTE
GROUP THERAPY PROGRESS NOTE    Patient is participating in recreational therapy group.    Group time: 30 minutes    Personal goal for participation: To participate in Bridges and Walls art Activity    Goal orientation: Personal    Group therapy participation:  Active     Therapeutic interventions reviewed and discussed:  Group members were given the opportunity to engage in an art activity that will help them to visualize boundaries and barriers to achieving future goals. Handouts and supplies provided    Impression of participation: Pt was present and engaged in group discussion and activity.       Katherine Gillies, MSW, HP-A

## 2024-06-19 NOTE — PROGRESS NOTES
Behavioral Services  Medicare Certification Upon Admission    I certify that this patient's inpatient psychiatric hospital admission is medically necessary for:    [x] (1) Treatment which could reasonably be expected to improve this patient's condition,       [] (2) Or for diagnostic study;     AND     [x](2) The inpatient psychiatric services are provided while the individual is under the care of a physician and are included in the individualized plan of care.    Estimated length of stay/service 3-5 days.    Plan for post-hospital care Outpatient Psychiatry.    Electronically signed by René Miranda MD on 6/19/2024 at 11:40 AM

## 2024-06-19 NOTE — CARE COORDINATION
06/19/24 0831   ITP   Date of Plan 06/19/24   Date of Next Review 06/26/24   Primary Diagnosis Code JOSHUA   Barriers to Treatment Need for psychoeducation   Strengths Incorporated in Plan Acknowledging need for assistance;Family supports   Plan of Care   Long Term Goal (LTG) Stated in patient/guardian terms Participate in out pt tx 12 wks   Short Term Goal 1   Short Term Goal 1 Client will be oriented to program and staff, and participate in assessment process   Baseline Functioning able to verbalize needs   Target 1 week   Objectives Client will participate in group therapy   Intervention 1 Acknowledge client strengths   Frequency daily   Measured by Staff observation;Self report;Behavioral data   Staff Responsible Clinical staff   Intervention 2 Group therapy   Frequency daily   Measured by Staff observation;Self report   Staff Responsible Clinical staff   Intervention 3 Monitor medications   Frequency daily   Measured by Staff observation;Behavioral data   Staff Responsible Clinical staff   STG Goal 1 Status: Patient Appears to be  Progressing toward treatment plan goal   Crisis/Safety/Discharge Plan   Crisis/Safety Plan Standard program interventions and protocol   Comprehensive Assessment Completion Date 06/19/24   Discharge Plan Pt to discharge home with follow up care       
   06/19/24 1039   Suicidal Ideation   Wish to be Dead Lifetime - No;Past 1 month - No   Non-Specific Active Suicidal Thoughts Lifetime - No;Past 1 month - No   Suicidal Behavior   Actual Attempt Lifetime - No   Has subject engaged in Non-Suicidal Self-Injurious Behavior? Lifetime - No   Interrupted Attempt Lifetime - No   Aborted or Self-Interrupted Attempt Lifetime - No   Preparatory Acts or Behavior Lifetime - No       
No

## 2024-06-19 NOTE — BH NOTE
PSYCHOSOCIAL ASSESSMENT  :Patient identifying info:   Chaz Jackson is a 64 y.o., male admitted 6/18/2024 11:14 AM     Presenting problem and precipitating factors: Pt reported to ED due to increased depressive symptoms, anxiety and SI with no plan. Pt denies HI, AHVH.     Mental status assessment: alert oriented , x's 3 , denies SI, denies AH or VH - insight and judgement are impaired     Strengths/Recreation/Coping Skills:supportive family, safe housing     Collateral information: Vita Galvan  Domestic Partner  [phone]    Current psychiatric /substance abuse providers and contact info: No current psychiatric providers     Previous psychiatric/substance abuse providers and response to treatment: Pt has no previous psych admissions.     Family history of mental illness or substance abuse: Family history of SA - brother and sister     Substance abuse history:  THC   Social History     Tobacco Use    Smoking status: Former     Current packs/day: 0.00     Types: Cigarettes     Quit date: 6/15/2021     Years since quitting: 3.0     Passive exposure: Past    Smokeless tobacco: Current     Types: Chew   Substance Use Topics    Alcohol use: Yes     Alcohol/week: 2.0 standard drinks of alcohol     Types: 2 Cans of beer per week       History of biomedical complications associated with substance abuse: no     Patient's current acceptance of treatment or motivation for change: voluntary     Family constellation: single, I adult child    Is significant other involved? yes    Describe support system: family     Describe living arrangements and home environment: Pt lives alone     GUARDIAN/POA: No    Guardian Name:     Guardian Contact:     Health issues: History reviewed. No pertinent past medical history. On disability for cardiac issues    PCP - Dr. Jonh Campbell in [place]    Trauma history: no     Legal issues: no     History of  service: no     Financial status: disability     Christianity/cultural factors:  not

## 2024-06-19 NOTE — PLAN OF CARE
Problem: Safety - Adult  Goal: Free from fall injury  6/19/2024 0011 by Rosario Simpson RN  Outcome: Progressing   Patient resting quietly in bed. No signs of distress. Even and unlabored breathing. Staff will continue to monitor safety q15 and provide support.

## 2024-06-19 NOTE — ED NOTES
TRANSFER - OUT REPORT:    Verbal report given to Rosario RN on Chaz Jackson  being transferred to [facility] for routine progression of patient care       Report consisted of patient's Situation, Background, Assessment and   Recommendations(SBAR).     Information from the following report(s) Nurse Handoff Report, ED SBAR, Recent Results, and Event Log was reviewed with the receiving nurse.    Cumby Fall Assessment:                           Lines:       Opportunity for questions and clarification was provided.      Patient transported with:  [employer]

## 2024-06-19 NOTE — BH NOTE
4 Eyes Skin Assessment     NAME:  Chaz Jackson  YOB: 1960  MEDICAL RECORD NUMBER:  [id number]    The patient is being assessed for  Admission    I agree that at least one RN has performed a thorough Head to Toe Skin Assessment on the patient. ALL assessment sites listed below have been assessed.      Areas assessed by both nurses:    Head, Face, Ears, Shoulders, Back, Chest, Arms, Elbows, Hands, Sacrum. Buttock, Coccyx, Ischium, and Legs. Feet and Heels        Does the Patient have a Wound? No noted wound(s)       Ortiz Prevention initiated by RN: No  Wound Care Orders initiated by RN: No    Pressure Injury (Stage 3,4, Unstageable, DTI, NWPT, and Complex wounds) if present, place Wound referral order by RN under : No    New Ostomies, if present place, Ostomy referral order under : No     Nurse 1 eSignature: Electronically signed by MELINA MAI RN on 6/18/24 at 10:55 PM EDT    **SHARE this note so that the co-signing nurse can place an eSignature**    Nurse 2 eSignature: April, SE

## 2024-06-19 NOTE — PROGRESS NOTES
Admission Medication Reconciliation:    Information obtained from:  patient interview, Insurance claims data, review of EMR, VAPMP, and communication with Vita (partner) and cardiologist office  RxQuery data available¹:  YES    Comments/Recommendations: Updated PTA meds/reviewed patient's allergies.    1)  Medications verified. Patient's partner brought in pill boxes with medications - attempted to identify. Concerned about Entresto given markings and color, called [facility] to confirm medications and doses.     2)  The [place] Prescription Monitoring Program () was assessed to determine fill history of any controlled medications. The patient has NOT filled any controlled medications in the last 12 months.    3)  Medication changes (since last review):  Added  - apixaban 5 mg BID  - Entresto 24-26 mg BID  - aspirin 81 mg daily  - bumetanide 1 mg daily PRN (not taking)    Removed  - rivaroxaban   ¹RxQuery pharmacy benefit data reflects medications filled and processed through the patient's insurance, however this data does NOT capture whether the medication was picked up or is currently being taken by the patient.    Allergies:  Patient has no known allergies.    Significant PMH/Disease States: History reviewed. No pertinent past medical history.    Chief Complaint for this Admission:    Chief Complaint   Patient presents with    Suicidal     Prior to Admission Medications:   Prior to Admission Medications   Prescriptions Last Dose Informant   JARDIANCE 25 MG tablet 6/17/2024    Sig: Take 1 tablet by mouth daily   TRESIBA FLEXTOUCH 200 UNIT/ML SOPN 6/17/2024    Sig: Inject 30 Units into the skin once Patient takes 30-40 units a day based on BG once in the am   apixaban (ELIQUIS) 5 MG TABS tablet 6/17/2024    Sig: Take 1 tablet by mouth 2 times daily   aspirin 81 MG EC tablet Unknown    Sig: Take 1 tablet by mouth daily   atorvastatin (LIPITOR) 40 MG tablet 6/17/2024    Sig: Take 1 tablet by  mouth daily   bumetanide (BUMEX) 1 MG tablet Not Taking    Sig: Take 1 tablet by mouth daily as needed   Patient not taking: Reported on 6/19/2024   digoxin (LANOXIN) 125 MCG tablet 6/17/2024    Sig: Take 1 tablet by mouth daily   loperamide (IMODIUM) 2 MG capsule 6/17/2024    Sig: Take 1 capsule by mouth 4 times daily as needed for Diarrhea   metoprolol succinate (TOPROL XL) 100 MG extended release tablet 6/17/2024    Sig: Take 1 tablet by mouth daily   sacubitril-valsartan (ENTRESTO) 24-26 MG per tablet 6/17/2024    Sig: Take 1 tablet by mouth 2 times daily   spironolactone (ALDACTONE) 25 MG tablet 6/17/2024    Sig: Take 1 tablet by mouth daily      Facility-Administered Medications: None     Ruthie Guidry RPH

## 2024-06-19 NOTE — BH NOTE
GROUP THERAPY PROGRESS NOTE    Patient is participating in Healthy living group.    Group time: 30 minutes    Personal goal for participation: Develop an understanding of sleep hygiene    Goal orientation: Personal    Group therapy participation: Active     Therapeutic interventions reviewed and discussed: Group members were able watch a video to help develop an understanding of how sleep patterns effect mental health. Members were guided through developing an understanding of sleep hygiene. Handouts provided.    Impression of participation: Pt was present and engaged in group discussion.       Katherine Gillies MSW, QMHP-A

## 2024-06-19 NOTE — CONSULTS
[facility]  PROGRAM FOR DIABETES HEALTH  DIABETES MANAGEMENT CONSULT    Consulted by Nursing for advanced nursing evaluation and care for inpatient blood glucose management.    Evaluation and Action Plan   Chaz Jackson is a 64 y.o. male admitted to [facility] for SI / insomnia - This is his first experience with this type of issue with his mental health.      Discussed diabetes self mgmt practice at initial consult visit.  He endorsed monitoring his BG via CGM, Dexcom G7.  He verbalized a recent A1C result as of 1 month ago at 6.4%.  He is established with providers through [facility] near [place], [place].  He endorses he takes Tresiba 'when he needs it' and self adjusts dosing based on BG.  He provided a brief discussion of his diet - He is more concerned with how to improve his sleep and anxiety that he is experiencing.  Discussed my role in his care re: keeping -180 while hospitalized.      6/19: BG trends since admission noted,  over past 24h.  To keep -180 while in [facility], initiated reduced dose basal insulin as per below with hold parameters if BG <130. Diet order adjusted to carb consistent options.     Management Rationale Action Plan   Medication   Basal needs Using 0.15 units/kg/D based on BMI  50% reduction from PTA basal dosing START Lantus 15 units QHS, HOLD for BG <130   Nutritional needs Covering for carb load with meals  NA   Corrective insulin Using MEDIUM sensitivity  ACHS   Lab [x]        Hemoglobin A1c-ADD ON     Additional orders          Diabetes Discharge Plan   Medication  RESUME diabetes medications per PTA dosing           Initial Presentation   Chaz Jackson is a 64 y.o. male admitted 6/18/24  after experiencing insomnia with SI- multiple plans.      HX: History reviewed. No pertinent past medical history.     INITIAL DX: JOSHUA (generalized anxiety disorder) [F41.1]  Depression with suicidal ideation [F32.A, R45.851]     Current Treatment     TX: U     Hospital Course   Clinical

## 2024-06-19 NOTE — BSMART NOTE
Patient was accepted to [facility] bed 727-A by STACY Munoz on behalf of Dr. Miranda. The number for report is ext [phone]. Patient's nurse notified.

## 2024-06-19 NOTE — PLAN OF CARE
Problem: Safety - Adult  Goal: Free from fall injury  Outcome: Progressing     Problem: Depression  Goal: Will be euthymic at discharge  Description: INTERVENTIONS:  1. Administer medication as ordered  2. Provide emotional support via 1:1 interaction with staff  3. Encourage involvement in milieu/groups/activities  4. Monitor for social isolation  6/19/2024 1540 by Brandy Tong RN  Outcome: Progressing   Pt out on unit engaged with peers. Denies current SI/HI/AVH. Remains medication and meal compliant. Will continue to monitor q15 minutes for safety.

## 2024-06-20 LAB
EST. AVERAGE GLUCOSE BLD GHB EST-MCNC: 128 MG/DL
GLUCOSE BLD STRIP.AUTO-MCNC: 110 MG/DL (ref 65–117)
GLUCOSE BLD STRIP.AUTO-MCNC: 125 MG/DL (ref 65–117)
GLUCOSE BLD STRIP.AUTO-MCNC: 125 MG/DL (ref 65–117)
GLUCOSE BLD STRIP.AUTO-MCNC: 134 MG/DL (ref 65–117)
HBA1C MFR BLD: 6.1 % (ref 4–5.6)
SERVICE CMNT-IMP: ABNORMAL
SERVICE CMNT-IMP: NORMAL

## 2024-06-20 PROCEDURE — 6370000000 HC RX 637 (ALT 250 FOR IP)

## 2024-06-20 PROCEDURE — 82962 GLUCOSE BLOOD TEST: CPT

## 2024-06-20 PROCEDURE — 1240000000 HC EMOTIONAL WELLNESS R&B

## 2024-06-20 PROCEDURE — 6370000000 HC RX 637 (ALT 250 FOR IP): Performed by: PSYCHIATRY & NEUROLOGY

## 2024-06-20 RX ADMIN — SPIRONOLACTONE 25 MG: 25 TABLET ORAL at 08:29

## 2024-06-20 RX ADMIN — APIXABAN 5 MG: 5 TABLET, FILM COATED ORAL at 08:29

## 2024-06-20 RX ADMIN — METOPROLOL SUCCINATE 100 MG: 25 TABLET, EXTENDED RELEASE ORAL at 08:29

## 2024-06-20 RX ADMIN — ASPIRIN 81 MG: 81 TABLET, COATED ORAL at 08:29

## 2024-06-20 RX ADMIN — LOPERAMIDE HYDROCHLORIDE 2 MG: 2 CAPSULE ORAL at 16:33

## 2024-06-20 RX ADMIN — DIGOXIN 125 MCG: 125 TABLET ORAL at 08:29

## 2024-06-20 RX ADMIN — EMPAGLIFLOZIN 25 MG: 10 TABLET, FILM COATED ORAL at 08:28

## 2024-06-20 RX ADMIN — ATORVASTATIN CALCIUM 40 MG: 20 TABLET, FILM COATED ORAL at 20:40

## 2024-06-20 RX ADMIN — SACUBITRIL AND VALSARTAN 1 TABLET: 24; 26 TABLET, FILM COATED ORAL at 08:29

## 2024-06-20 RX ADMIN — APIXABAN 5 MG: 5 TABLET, FILM COATED ORAL at 20:40

## 2024-06-20 RX ADMIN — SERTRALINE HYDROCHLORIDE 50 MG: 50 TABLET ORAL at 08:29

## 2024-06-20 RX ADMIN — TRAZODONE HYDROCHLORIDE 50 MG: 50 TABLET ORAL at 23:06

## 2024-06-20 RX ADMIN — SACUBITRIL AND VALSARTAN 1 TABLET: 24; 26 TABLET, FILM COATED ORAL at 20:40

## 2024-06-20 ASSESSMENT — PAIN - FUNCTIONAL ASSESSMENT: PAIN_FUNCTIONAL_ASSESSMENT: NONE - DENIES PAIN

## 2024-06-20 NOTE — DIABETES MGMT
[facility]  PROGRAM FOR DIABETES HEALTH  DIABETES MANAGEMENT CONSULT    Consulted by Nursing for advanced nursing evaluation and care for inpatient blood glucose management.    Evaluation and Action Plan   Chaz Jackson is a 64 y.o. male admitted to [facility] for SI / insomnia - This is his first experience with this type of issue with his mental health.      Discussed diabetes self mgmt practice at initial consult visit.  He endorsed monitoring his BG via CGM, Dexcom G7.  He verbalized a recent A1C result as of 1 month ago at 6.4%.  He is established with providers through [facility] near [place], [place].  He endorses he takes Tresiba 'when he needs it' and self adjusts dosing based on BG.  He provided a brief discussion of his diet - He is more concerned with how to improve his sleep and anxiety that he is experiencing.  Discussed my role in his care re: keeping -180 while hospitalized.      6/20: BG trends since admission remain in target/ Noted oral Jardiance resumed per PTA dosing- Will hold basal insulin since oral diabetes medication resumed.      Diabetes Management Team to sign off at this point as patient's blood glucose remains stable.  Please re-consult us if patient needs change.  Thank you for including us in their care.    .     Management Rationale Action Plan   Medication   Basal needs Using 0.15 units/kg/D based on BMI  50% reduction from PTA basal dosing HOLD   Nutritional needs Covering for carb load with meals  NA   Corrective insulin Using MEDIUM sensitivity  ACHS   Lab [x]        Hemoglobin A1c-ADD ON     Additional orders  Jardiance 25mg daily resumed while inpatient by psych provider.         Diabetes Discharge Plan   Medication  RESUME diabetes medications per PTA dosing           Initial Presentation   Chaz Jackson is a 64 y.o. male admitted 6/18/24  after experiencing insomnia with SI- multiple plans.      HX: History reviewed. No pertinent past medical history.     INITIAL DX: JOSHUA  bedside.  Total minutes: <25    DELMIS SCHULZ APRN - CNS  Diabetes Clinical Nurse Specialist  Program for Diabetes Health  Access via [employer]

## 2024-06-20 NOTE — PLAN OF CARE
Problem: Depression  Goal: Will be euthymic at discharge  Description: INTERVENTIONS:  1. Administer medication as ordered  2. Provide emotional support via 1:1 interaction with staff  3. Encourage involvement in milieu/groups/activities  4. Monitor for social isolation  6/19/2024 2595 by Chance Jerry RN  Outcome: Progressing     Problem: Anxiety  Goal: Will report anxiety at manageable levels  Description: INTERVENTIONS:  1. Administer medication as ordered  2. Teach and rehearse alternative coping skills  3. Provide emotional support with 1:1 interaction with staff  Outcome: Progressing

## 2024-06-20 NOTE — DISCHARGE INSTRUCTIONS
DISCHARGE SUMMARY    NAME:Chaz Jackson  : 1960  MRN: [id number]    The patient Chaz Jackson exhibits the ability to control behavior in a less restrictive environment.  Patient's level of functioning is improving.  No assaultive/destructive behavior has been observed for the past 24 hours.  No suicidal/homicidal threat or behavior has been observed for the past 24 hours.  There is no evidence of serious medication side effects.  Patient has not been in physical or protective restraints for at least the past 24 hours.    If weapons involved, how are they secured? None    Is patient aware of and in agreement with discharge plan? Yes    Arrangements for medication:  Prescriptions filled through [facility], 30 day supply provided    Copy of discharge instructions to provider?:  Yes    Arrangements for transportation home: Family    Keep all follow up appointments as scheduled, continue to take prescribed medications per physician instructions.  Mental health crisis number:  911 or your local mental health crisis line number at [facility] [phone]       Mental Health Emergency WARM LINE      [phone])      M-F: 9am to 9pm      Sat & Sun: 5pm - 9pm  National suicide prevention lines:                             1-636-CMFVJKH (9-248-827-6459)       8-433-379-TALK ([phone])    Crisis Text Line:  Text HOME to 511365    DISCHARGE SUMMARY from Nurse    PATIENT INSTRUCTIONS:      What to do at Home:  Recommended activity: activity as tolerated,     If you experience any of the following symptoms: racing thoughts, poor sleep that is less than 6 total with more than 3 times you awake that happens consecutively for 3 days, uncontrolled anxiety leading to hopelessness - talk with your support system including friends and family. Engage in your coping skills such as distraction with knitting and reading. If sleep is difficult get out of bed, sit in a chair or couch and work on a simple

## 2024-06-20 NOTE — PLAN OF CARE
Problem: Depression  Goal: Will be euthymic at discharge  Description: INTERVENTIONS:  1. Administer medication as ordered  2. Provide emotional support via 1:1 interaction with staff  3. Encourage involvement in milieu/groups/activities  4. Monitor for social isolation  6/20/2024 1610 by Pam Palacios, RN  Outcome: Progressing     Problem: Anxiety  Goal: Will report anxiety at manageable levels  Description: INTERVENTIONS:  1. Administer medication as ordered  2. Teach and rehearse alternative coping skills  3. Provide emotional support with 1:1 interaction with staff  Recent Flowsheet Documentation  Taken 6/20/2024 1608 by Pam Palacios, RN  Will report anxiety at manageable levels:   Administer medication as ordered   Teach and rehearse alternative coping skills

## 2024-06-20 NOTE — BH NOTE
[facility]  PSYCHIATRIC PROGRESS NOTE    Patient: Chaz Jackson MRN: [id number]  SSN: [id number]    YOB: 1960  Age: 64 y.o.  Sex: male      Admit Date: 6/18/2024    Length of Stay: 2 Days    Legal Status: Voluntary     Chief Complaint: \"I'm thinking more positively.\"     Interval History:   6/20/24- Chaz states he's thinking more positively today. He states he is no longer feeling like a \"negative Carol\" and reports he feels that the medicine is already starting to help, and also feels that participating in group has been beneficial, as it has offered him some perspective about the universality of mental health struggles and has helped him feel like he's not alone. He has been talking with his family and has been trying to figure out his plans for discharge. He is talking about the stress of having to sell his home in [place], but is acknowledging that he does have the help and the resources he needs to get this done. He denies SI/plan/intent, denies HI/plan/intent, denies AVH, no evidence of any psychotic processes observed. No complaints reported with eating or sleeping.  Chaz Jackson has been compliant with medications and finds them beneficial. Denies adverse effects. Positive interactions with peers and staff observed. Denies other changes or new concerns or questions at this time.     Vitals:  Patient Vitals for the past 24 hrs:   Temp Pulse Resp BP SpO2   06/20/24 0728 97.3 °F (36.3 °C) 51 16 115/69 96 %   06/19/24 2103 -- -- -- 120/85 --   06/19/24 2034 97.5 °F (36.4 °C) 65 16 120/85 99 %   06/19/24 1622 97.5 °F (36.4 °C) 99 16 121/70 --   06/19/24 1115 97.7 °F (36.5 °C) 71 16 109/72 98 %     Labs:  Lab Results   Component Value Date/Time    WBC 7.7 06/18/2024 11:29 AM    HGB 15.8 06/18/2024 11:29 AM    HCT 45.8 06/18/2024 11:29 AM     06/18/2024 11:29 AM    MCV 88.4 06/18/2024 11:29 AM      Lab Results   Component Value Date/Time     06/18/2024 11:29 AM    K 3.3 06/18/2024  11:29 AM     06/18/2024 11:29 AM    CO2 20 06/18/2024 11:29 AM    BUN 16 06/18/2024 11:29 AM    GLOB 2.9 06/18/2024 11:29 AM    ALT 33 06/18/2024 11:29 AM      Scheduled Meds:   sertraline  50 mg Oral Daily    atorvastatin  40 mg Oral Nightly    metoprolol succinate  100 mg Oral Daily    spironolactone  25 mg Oral Daily    digoxin  125 mcg Oral Daily    empagliflozin  25 mg Oral Daily    sacubitril-valsartan  1 tablet Oral BID    apixaban  5 mg Oral BID    aspirin  81 mg Oral Daily    [Held by provider] insulin glargine  15 Units SubCUTAneous Nightly    insulin lispro  0-8 Units SubCUTAneous TID WC    insulin lispro  0-4 Units SubCUTAneous Nightly     PRN Meds:  glucose, dextrose bolus **OR** dextrose bolus, glucagon (rDNA), dextrose, acetaminophen, polyethylene glycol, senna, aluminum & magnesium hydroxide-simethicone, hydrOXYzine HCl, haloperidol **OR** haloperidol lactate, diphenhydrAMINE, traZODone, loperamide, hydrocortisone    Mental Status Exam:  64 y.o. male who is moderately groomed, well engaged in evaluation.   Makes fair eye contact.  Psychomotor activity is WNL, no adventitious movements  Speech is normal in volume, tone, output and prosody   Mood is described as \"OK\"   Affect is congruent with mood, euthymic and full  No perceptual abnormalities elicited; no auditory/visual hallucinations reported, no overt signs of psychosis or paranoia.   Thought process is logical, linear and goal directed  Thought content is negative for suicidal or homicidal ideation  Alert, awake and oriented in all spheres  Attention/Concentration are intact  Insight and judgment are intact    Assessment:   Chaz Jackson meets criteria for a diagnosis of:  MDD, Recurrent, Severe, w/o psychosis    Plan:   6/20/24- Continue the medication regimen as prescribed. Tentative discharge tomorrow (Friday)    A coordinated, multidisplinary treatment team round was conducted with the patient, nurses, pharmacist,  and writer

## 2024-06-20 NOTE — INTERDISCIPLINARY ROUNDS
Behavioral Health Interdisciplinary Rounds     Patient Name: Chaz Jackson  Age: 64 y.o.  Room/Bed:  727/  Primary Diagnosis: JOSHUA (generalized anxiety disorder)   Admission Status: Voluntary    Readmission within 30 days: No  Power of  in place: No  Patient requires a blocked bed: No          Reason for blocked bed:   Sleep hours:        Participation in Care/Groups:  Yes  Medication Compliant?: Yes  PRNS (last 24 hours): Antianxiety and Sleep Aid   Restraints (last 24 hours):  No  __________________________________________________  OQ Admission Analysis Survey completed:  OQ Admission Analysis Survey score:  __________________________________________________     Alcohol screening (AUDIT) completed -     If applicable, date SBIRT discussed in treatment team AND documented:    Tobacco - patient is a smoker:    Illegal Drugs use:      24 hour chart check complete: Yes    _______________________________________________    Patient goal(s) for today:   Treatment team focus/goals:   Progress note: Patient met with treatment team and appeared with an anxious but fair mood and affect. Patient denies SI/HI and AHVH at this time, denies issues with medications and feels they are starting to be effective. Patient is discharge focused, plan to discharge tomorrow. SW to provide patient with outpatient resource information but due to relocation and process of moving will not schedule appointments at this time. SW to provide resources near [place], [place] as he will be moving there to live with partner.    LOS:  2  Expected LOS: 3    Participating treatment team members: Chaz Jackson, Dee Vidal, MSW, Myrtle VELASCO RN, Consuelo Munoz, STACY

## 2024-06-20 NOTE — PLAN OF CARE
Problem: Depression  Goal: Will be euthymic at discharge  Description: INTERVENTIONS:  1. Administer medication as ordered  2. Provide emotional support via 1:1 interaction with staff  3. Encourage involvement in milieu/groups/activities  4. Monitor for social isolation  6/20/2024 1050 by Myrtle Dove, RN  Outcome: Progressing  Note: Out on unit brighter affect, appears calm and reports feeling confident in his own ability to problem solve and maintain his own safety. Motivated to talk with his support system including girlfriend and daughter about his needs and and what assistance is needed to problem solve his house sale. Daily goal is to rest and stay active in groups. Staff focus is on offering support and reassurance.

## 2024-06-20 NOTE — BH NOTE
GROUP THERAPY PROGRESS NOTE    Patient is participating in recreational therapy group.    Group time: 30 minutes    Personal goal for participation: To engage in independent recreation activity.    Goal orientation: Personal    Group therapy participation: passive     Therapeutic interventions reviewed and discussed:  Group members are given an opportunity for individual recreation. Pts can choose any appropriate activity.     Impression of participation: Pt was engaged in independent recreation including puzzles, cards, games, tv, drawing/coloring, reading, etc.      Katherine Gillies, MSW, [facility]

## 2024-06-20 NOTE — BH NOTE
GROUP THERAPY PROGRESS NOTE    Patient is participating in psychoeducation group.    Group time: 40 mins.    Personal goal for participation: to develop an understanding of thinking errors and how they affect our day to day thinking patterns.    Goal orientation: Personal    Group therapy participation:  Active     Therapeutic interventions reviewed and discussed:  Group members were guided through learning about thinking traps through discussion and video. Members gained an understanding of how these thinking traps effect perception, relationships, and overall mental health. Members were guided through learning about methods that can be used for changing negative thought patterns. Handouts provided.    Impression of participation:  Pt was present and engaged in group discussion. Pt added insight to group topic.     Katherine Gillies, MSW, [facility]

## 2024-06-20 NOTE — BH NOTE
GROUP THERAPY PROGRESS NOTE    Patient is participating in Self-care group.    Group time: 30 minutes    Personal goal for participation: To gain an understanding of emotions and emotional regulation.    Goal orientation: Personal    Group therapy participation: Active     Therapeutic interventions: Group members were educated about the 8 primary emotions. Members also gained an understanding of what emotions do, how we can regulate them and myths about emotions. Handouts provided.    Impression of participation:  Pt was present and engaged in group discussion and activity. Pt added insight and interacted with SW and peers. Pt was calm, cooperative       Katherine Gillies, MSW, [facility]

## 2024-06-21 VITALS
WEIGHT: 224 LBS | HEIGHT: 72 IN | DIASTOLIC BLOOD PRESSURE: 84 MMHG | SYSTOLIC BLOOD PRESSURE: 132 MMHG | TEMPERATURE: 97.5 F | BODY MASS INDEX: 30.34 KG/M2 | OXYGEN SATURATION: 98 % | RESPIRATION RATE: 20 BRPM | HEART RATE: 55 BPM

## 2024-06-21 LAB
GLUCOSE BLD STRIP.AUTO-MCNC: 113 MG/DL (ref 65–117)
GLUCOSE BLD STRIP.AUTO-MCNC: 127 MG/DL (ref 65–117)
SERVICE CMNT-IMP: ABNORMAL
SERVICE CMNT-IMP: NORMAL

## 2024-06-21 PROCEDURE — 82962 GLUCOSE BLOOD TEST: CPT

## 2024-06-21 PROCEDURE — 6370000000 HC RX 637 (ALT 250 FOR IP): Performed by: PSYCHIATRY & NEUROLOGY

## 2024-06-21 PROCEDURE — 6370000000 HC RX 637 (ALT 250 FOR IP)

## 2024-06-21 RX ORDER — TRAZODONE HYDROCHLORIDE 50 MG/1
50 TABLET ORAL NIGHTLY PRN
Qty: 30 TABLET | Refills: 0 | Status: SHIPPED | OUTPATIENT
Start: 2024-06-21 | End: 2024-07-21

## 2024-06-21 RX ORDER — HYDROXYZINE 50 MG/1
50 TABLET, FILM COATED ORAL 2 TIMES DAILY PRN
Qty: 60 TABLET | Refills: 0 | Status: SHIPPED | OUTPATIENT
Start: 2024-06-21 | End: 2024-07-21

## 2024-06-21 RX ADMIN — LOPERAMIDE HYDROCHLORIDE 2 MG: 2 CAPSULE ORAL at 09:39

## 2024-06-21 RX ADMIN — METOPROLOL SUCCINATE 100 MG: 25 TABLET, EXTENDED RELEASE ORAL at 08:16

## 2024-06-21 RX ADMIN — SACUBITRIL AND VALSARTAN 1 TABLET: 24; 26 TABLET, FILM COATED ORAL at 08:16

## 2024-06-21 RX ADMIN — DIGOXIN 125 MCG: 125 TABLET ORAL at 08:17

## 2024-06-21 RX ADMIN — SPIRONOLACTONE 25 MG: 25 TABLET ORAL at 08:17

## 2024-06-21 RX ADMIN — APIXABAN 5 MG: 5 TABLET, FILM COATED ORAL at 08:17

## 2024-06-21 RX ADMIN — ASPIRIN 81 MG: 81 TABLET, COATED ORAL at 08:17

## 2024-06-21 RX ADMIN — EMPAGLIFLOZIN 25 MG: 10 TABLET, FILM COATED ORAL at 08:17

## 2024-06-21 RX ADMIN — SERTRALINE HYDROCHLORIDE 50 MG: 50 TABLET ORAL at 08:17

## 2024-06-21 NOTE — PLAN OF CARE
0900: Reports feeling well and sleep was not good and feels it may be because he did not take Atarax last night. Staff reports 7+ hours sleep. Reports feeling safe enough to d/c home. [facility] children and girlfriend are here to help and support. [facility] does not have thoughts to hurt self. Medication and safety education provided. Medications sent to [facility] pharmacy for pickup prior to d/c. Girlfriend will  at 1300.      Problem: Discharge Planning  Goal: Discharge to home or other facility with appropriate resources  Outcome: Progressing     Problem: Safety - Adult  Goal: Free from fall injury  Outcome: Progressing     Problem: Depression  Goal: Will be euthymic at discharge  Description: INTERVENTIONS:  1. Administer medication as ordered  2. Provide emotional support via 1:1 interaction with staff  3. Encourage involvement in milieu/groups/activities  4. Monitor for social isolation  6/21/2024 0901 by Madelin Hamilton, RN  Outcome: Progressing  6/21/2024 0024 by Chance Jerry, RN  Outcome: Progressing     Problem: Anxiety  Goal: Will report anxiety at manageable levels  Description: INTERVENTIONS:  1. Administer medication as ordered  2. Teach and rehearse alternative coping skills  3. Provide emotional support with 1:1 interaction with staff  6/21/2024 0901 by Madelin Hamilton, RN  Outcome: Progressing  6/21/2024 0024 by Chance Jerry, RN  Outcome: Progressing  Flowsheets (Taken 6/20/2024 1608 by Pam Palacios, RN)  Will report anxiety at manageable levels:   Administer medication as ordered   Teach and rehearse alternative coping skills

## 2024-06-21 NOTE — BH NOTE
Pt states\"I bumped my elbow before my admission \" \"I do not want anyone to know,the doctor will not discharge me tomorrow!!!\" Family brought it to staff attention.pt refused to elaborate.  Note right elbow slightly swollen.note full range of motion at present.skin intact,no discoloration noted. aware of above.

## 2024-06-21 NOTE — TRANSITION OF CARE
Behavioral Health Transition Record    Patient Name: Chaz Jackson  YOB: 1960   Medical Record Number: [id number]  Date of Admission: 6/18/2024 11:14 AM   Date of Discharge: 6/21/24    Attending Provider: René Miranda MD   Discharging Provider: Dr. Miranda  To contact this individual call [phone] and ask the  to page.  If unavailable, ask to be transferred to Behavioral Health Provider on call.  A Behavioral Health Provider will be available on call 24/7 and during holidays.    Primary Care Provider: Norma Lopez APRN - NP    No Known Allergies    Reason for Admission: Chaz Jackson is a 64 y.o. White (non-) male who is currently admitted to the general side of 7th floor behavioral health Unit at [facility].      Patient says that he was quite overwhelmed for the past few months as he's been trying to figure out how to sell his house in [place] and move back to [place] where his GF lives, and most of his supports. He's been trying to get in with mental health now for a few weeks but the wait has been long. He reports that his sleep has been terrible and wasn't able to fall or stay asleep. He feels quite anxious through the day. He starts thinking about being dead, which he describes being easier than getting to a happy spot. However, he reports that he wouldn't do it.     Denies experiencing AVH.     Pt reports that he possesses some hunting rifles at home and that he would be willing to let them go before his return.    Admission Diagnosis: JOSHUA (generalized anxiety disorder) [F41.1]  Depression with suicidal ideation [F32.A, R45.851]    * No surgery found *    Results for orders placed or performed during the hospital encounter of 06/18/24   Urine Culture Hold Sample    Specimen: Urine   Result Value Ref Range    Specimen HOld        Urine on hold in Microbiology dept for 2 days.  If unpreserved urine is submitted, it cannot be used for addtional testing after 24 hours,

## 2024-06-21 NOTE — DISCHARGE SUMMARY
DISCHARGE SUMMARY    Some parts of the discharge summary are from the initial Psychiatric interview that was done on admission by the admitting psychiatrist.      Date of Admission: 6/18/2024    Date of Discharge:6/21/2024     TYPE OF DISCHARGE:   REGULAR -  YES      ADMISSION EVALUATION:  CHIEF COMPLAINT: \"I was freaking out.\"      HISTORY OF PRESENTING COMPLAINT:  Chaz Jackson is a 64 y.o. White (non-) male who is currently admitted to the general side of 7th floor behavioral health Unit at [facility].      Patient says that he was quite overwhelmed for the past few months as he's been trying to figure out how to sell his house in [place] and move back to [place] where his GF lives, and most of his supports. He's been trying to get in with mental health now for a few weeks but the wait has been long. He reports that his sleep has been terrible and wasn't able to fall or stay asleep. He feels quite anxious through the day. He starts thinking about being dead, which he describes being easier than getting to a happy spot. However, he reports that he wouldn't do it.     Denies experiencing AVH.     Pt reports that he possesses some hunting rifles at home and that he would be willing to let them go before his return.     PAST PSYCHIATRIC HISTORY   No past inpatient psychiatric admissions  No suicide attempts     SUBSTANCE USE HISTORY:  Tobacco: chewing.  Cannabis: occasional   Alcohol: stopped drinking  IVD: none  No Cocaine/Heroin/amphetamines/LSD/PCP/Ketamine        Allergies: No Known Allergies     PAST MEDICAL HISTORY:     Please see H&P for details.      Past Medical History   History reviewed. No pertinent past medical history.     Home Medications           Prior to Admission medications    Medication Sig Start Date End Date Taking? Authorizing Provider   spironolactone (ALDACTONE) 25 MG tablet Take 1 tablet by mouth daily 4/25/24   Yes Provider, MD Iman   rivaroxaban (XARELTO) 20 MG TABS

## 2024-06-21 NOTE — INTERDISCIPLINARY ROUNDS
Behavioral Health Interdisciplinary Rounds     Patient Name: Chaz Jackson  Age: 64 y.o.  Room/Bed:  [facility]  Primary Diagnosis: JOSHUA (generalized anxiety disorder)   Admission Status: Voluntary    Readmission within 30 days: No  Power of  in place: No  Patient requires a blocked bed: No          Reason for blocked bed:  Sleep hours:        Participation in Care/Groups:  Yes  Medication Compliant?: Yes  PRNS (last 24 hours): Antianxiety and Sleep Aid   Restraints (last 24 hours):  No  __________________________________________________  OQ Admission Analysis Survey completed:  OQ Admission Analysis Survey score:  __________________________________________________     Alcohol screening (AUDIT) completed -     If applicable, date SBIRT discussed in treatment team AND documented:    Tobacco - patient is a smoker:    Illegal Drugs use:      24 hour chart check complete: Yes    _______________________________________________    Patient goal(s) for today:   Treatment team focus/goals:   Progress note:      Spiritual Care Consult:   Financial concerns/prescription coverage:    Family contact:                        Family requesting physician contact today:    Discharge plan:   Access to weapons :                                                              Outpatient provider(s):   Patient's preferred phone number for follow up call :   Patient's preferred e-mail address :    LOS:  3  Expected LOS:     Participating treatment team members: Chaz Jackson, * (assigned SW),

## 2024-06-21 NOTE — PLAN OF CARE
Problem: Depression  Goal: Will be euthymic at discharge  Description: INTERVENTIONS:  1. Administer medication as ordered  2. Provide emotional support via 1:1 interaction with staff  3. Encourage involvement in milieu/groups/activities  4. Monitor for social isolation  6/21/2024 0024 by Chance Jerry, RN  Outcome: Progressing     Problem: Anxiety  Goal: Will report anxiety at manageable levels  Description: INTERVENTIONS:  1. Administer medication as ordered  2. Teach and rehearse alternative coping skills  3. Provide emotional support with 1:1 interaction with staff  Outcome: Progressing  Flowsheets (Taken 6/20/2024 1608 by Pam Palacios, RN)  Will report anxiety at manageable levels:   Administer medication as ordered   Teach and rehearse alternative coping skills      heme/onc      - s/p 1 session chemotherapy: TLS labs significant for uric acid 8.2, phosphorus 5.6      - s/p 2 sessions chemotherapy: TLS labs significant for uric acid 8.3, phosphorus 5.4 s/p sevelamer       - s/p 3 sessions chemotherapy: TLS labs significant for uric acid 9.1, phosphorus 2.9      - most recent G6PD level 11.1 (wnl) following multiple transfusions of pRBC      - patient is at high risk for TLS but still requires chemotherapy iso lymphoma, comfortable to give rasburicase if needed with improved G6PD levels --> as per heme/onc if uric acid > 10, contact fellow first prior to 3mg rasburicase      - 9/4 AM with uric acid 9.4, phos 1.6, will d/c sevelamer   - on allopurinol 100mg  - uric acid still under threshold set by heme/onc will c/w monitor for now, if uptrending will reach out for further recs, no more scheduled sessions of chemotherapy